# Patient Record
Sex: MALE | Race: WHITE | NOT HISPANIC OR LATINO
[De-identification: names, ages, dates, MRNs, and addresses within clinical notes are randomized per-mention and may not be internally consistent; named-entity substitution may affect disease eponyms.]

---

## 2017-07-11 ENCOUNTER — APPOINTMENT (OUTPATIENT)
Dept: VASCULAR SURGERY | Facility: CLINIC | Age: 77
End: 2017-07-11

## 2017-07-11 VITALS
DIASTOLIC BLOOD PRESSURE: 77 MMHG | HEART RATE: 64 BPM | HEIGHT: 65 IN | SYSTOLIC BLOOD PRESSURE: 134 MMHG | RESPIRATION RATE: 16 BRPM | TEMPERATURE: 98.6 F | BODY MASS INDEX: 36.49 KG/M2 | WEIGHT: 219 LBS

## 2018-07-10 ENCOUNTER — APPOINTMENT (OUTPATIENT)
Dept: VASCULAR SURGERY | Facility: CLINIC | Age: 78
End: 2018-07-10
Payer: MEDICARE

## 2018-07-10 VITALS
SYSTOLIC BLOOD PRESSURE: 136 MMHG | WEIGHT: 215 LBS | HEIGHT: 65 IN | HEART RATE: 65 BPM | BODY MASS INDEX: 35.82 KG/M2 | DIASTOLIC BLOOD PRESSURE: 71 MMHG

## 2018-07-10 PROCEDURE — 93925 LOWER EXTREMITY STUDY: CPT

## 2018-07-10 PROCEDURE — 99213 OFFICE O/P EST LOW 20 MIN: CPT

## 2018-10-24 ENCOUNTER — APPOINTMENT (OUTPATIENT)
Dept: GASTROENTEROLOGY | Facility: CLINIC | Age: 78
End: 2018-10-24
Payer: MEDICARE

## 2018-10-24 VITALS
OXYGEN SATURATION: 99 % | SYSTOLIC BLOOD PRESSURE: 118 MMHG | WEIGHT: 221 LBS | HEART RATE: 72 BPM | RESPIRATION RATE: 14 BRPM | BODY MASS INDEX: 35.52 KG/M2 | DIASTOLIC BLOOD PRESSURE: 70 MMHG | HEIGHT: 66 IN

## 2018-10-24 DIAGNOSIS — K57.32 DIVERTICULITIS OF LARGE INTESTINE W/OUT PERFORATION OR ABSCESS W/OUT BLEEDING: ICD-10-CM

## 2018-10-24 PROCEDURE — 99204 OFFICE O/P NEW MOD 45 MIN: CPT

## 2018-10-25 PROBLEM — K57.32 DIVERTICULITIS OF SIGMOID COLON: Status: ACTIVE | Noted: 2018-10-25

## 2019-04-18 ENCOUNTER — APPOINTMENT (OUTPATIENT)
Dept: OTOLARYNGOLOGY | Facility: CLINIC | Age: 79
End: 2019-04-18
Payer: MEDICARE

## 2019-04-18 DIAGNOSIS — H91.8X2 OTHER SPECIFIED HEARING LOSS, LEFT EAR: ICD-10-CM

## 2019-04-18 PROCEDURE — 92557 COMPREHENSIVE HEARING TEST: CPT

## 2019-04-18 PROCEDURE — 99203 OFFICE O/P NEW LOW 30 MIN: CPT | Mod: 25

## 2019-04-18 PROCEDURE — 92567 TYMPANOMETRY: CPT

## 2019-04-18 NOTE — ASSESSMENT
[FreeTextEntry1] : Patient with bilateral snhl with marked difference in discrim score in left ear over past few mos.  Exam unremarkable.  Recommend MRI - further treatment pending result.  Follow up one month or as necessary.

## 2019-04-18 NOTE — HISTORY OF PRESENT ILLNESS
[de-identified] : Hx of hearing aides since 2016 - Approx 4 mos ago noted increased difficulty with left ear.  Cerumen cleaned and did have audio which showed marked decrease in hearing. Recently noted to have ? abn TM by audiologist.  No problem with ears.  Occ sl balance issues.  No imaging done  4 mos ago.

## 2019-04-18 NOTE — REVIEW OF SYSTEMS
[Sneezing] : sneezing [Seasonal Allergies] : seasonal allergies [Hearing Loss] : hearing loss [Post Nasal Drip] : post nasal drip [Cough] : cough [Negative] : Heme/Lymph

## 2019-05-21 ENCOUNTER — APPOINTMENT (OUTPATIENT)
Dept: OTOLARYNGOLOGY | Facility: CLINIC | Age: 79
End: 2019-05-21

## 2019-07-02 ENCOUNTER — APPOINTMENT (OUTPATIENT)
Dept: ORTHOPEDIC SURGERY | Facility: CLINIC | Age: 79
End: 2019-07-02
Payer: MEDICARE

## 2019-07-02 VITALS
BODY MASS INDEX: 35.52 KG/M2 | SYSTOLIC BLOOD PRESSURE: 119 MMHG | HEIGHT: 66 IN | WEIGHT: 221 LBS | HEART RATE: 77 BPM | DIASTOLIC BLOOD PRESSURE: 71 MMHG

## 2019-07-02 DIAGNOSIS — M25.422 EFFUSION, LEFT ELBOW: ICD-10-CM

## 2019-07-02 PROCEDURE — 99203 OFFICE O/P NEW LOW 30 MIN: CPT

## 2019-07-02 NOTE — PHYSICAL EXAM
[de-identified] : Constitutional: Well-nourished, well-developed, No acute distress\par Respiratory:  Good respiratory effort, no SOB\par Lymphatic: No regional lymphadenopathy, no lymphedema\par Psychiatric: Pleasant and normal affect, alert and oriented x3\par Skin: Clean dry and intact B/L UE/LE\par Musculoskeletal: normal except where as noted in regional exam\par \par \par Right Elbow:\par APPEARANCE: no marked deformities, no swelling or malalignment\par POSITIVE TENDERNESS: none\par NONTENDER: lateral and medial epicondyles, posterior capsules, and other areas of the elbow. \par ROM: full & painless flext/ext, pronation/supination. \par RESISTIVE TESTING: painless resisted elbow flexion/extension, wrist/long finger extension. painless resisted wrist/long finger flexion. painless resisted supination/pronation. \par SPECIAL TESTS: stable v/v stress. neg tinel's cubital tunnel\par Vasc: 2+ radial pulse\par Neuro: AIN, PIN, Ulnar nerve intact to motor\par Sensation: Intact to light touch throughout\par B/L Shoulders:  No asymmetry, malalignment, or swelling, Full ROM, 5/5 strength in flexion/ext, Abd/Add, IR/ER, Joints stable\par B/L Wrist and Hand:  No asymmetry, malalignment, or swelling, Full ROM, 5/5 strength in wrist and long finger flexion/ext, and radial/ulnar deviation, Joints stable\par \par Left Elbow:\par APPEARANCE: + small swelling posteriorly over the olecranon , no erythema, no fluctuance, no marked deformities or malalignment\par POSITIVE TENDERNESS: + mild tenderness with palpation of the olecranon bursa, no bony tenderness of the olecranon\par NONTENDER: lateral and medial epicondyles, posterior capsules, and other areas of the elbow. \par ROM: full & painless flext/ext, pronation/supination. \par RESISTIVE TESTING: painless resisted elbow flexion/extension, wrist/long finger flexion and extension, supination/pronation. \par SPECIAL TESTS: stable v/v stress. neg tinel's cubital tunnel\par Vasc: 2+ radial pulse\par Neuro: AIN, PIN, Ulnar nerve intact to motor\par Sensation: Intact to light touch throughout\par \par  [de-identified] : I reviewed and clinically correlated the following outside imaging studies,\par \par LEFT ELBOW XRAY\par HISTORY: M25.522 Left elbow pain\par Views:AP, lateral and oblique.\par There is an enthesophyte extending from the posterior aspect of the olecranon. A lucency\par extends through a portion of the enthesophyte, possibly indicating a fracture.\par The distal humerus, proximal radius and proximal ulna reveal intact cortical margins.\par The trabecular architecture is normal.\par There are no significant degenerative changes.\par There are no calcified joint bodies.\par There are no periarticular calcifications.\par There is no evidence of a joint nor bursal effusion.\par There are no radiopaque foreign bodies\par There is moderate olecranon bursal region soft tissue swelling.\par IMPRESSION: There is an enthesophyte extending from the posterior aspect of the\par olecranon. A lucency extends through a portion of the enthesophyte, possibly indicating a\par fracture of the enthesophyte.\par There is moderate olecranon bursal region soft tissue swelling.

## 2019-07-02 NOTE — DISCUSSION/SUMMARY
[de-identified] : Discussed findings of today's exam and possible causes of patient's pain.  Educated patient on their most probable diagnosis of small left olecranon effusion.  Reviewed possible courses of treatment, and we collaboratively decided best course of treatment at this time will include watchful waiting. I advised the patient that he only has approximately 2-3 cc of fluid within the bursa, would not recommend aspiration attempt at this time. Review of his prior x-ray shows that he has large osteophyte formation off the olecranon process, this is the etiology of his current bursa effusion. Recommend watchful waiting if it resolves on its own no further intervention is needed. If more fluid fills within the bursa may consider aspiration at that time.  Follow up as needed.  Patient appreciates and agrees with current plan.\par \par This note was generated using dragon medical dictation software.  A reasonable effort has been made for proofreading its contents, but typos may still remain.  If there are any questions or points of clarification needed please notify my office.

## 2019-07-02 NOTE — HISTORY OF PRESENT ILLNESS
[de-identified] : Patient is here for left elbow pain that began on 6/29/19 without inciting event. He said that 2 weeks prior he was on the swings with his granddaughter which was rubbing against his elbow but he did not notice any pain unless he touched his elbow. He was able to move it and continued to go to the gym without issue. On Saturday he had pain and was unable to sleep on that side. He took Aleve which has helped. He had an xray done yesterday. Denies N/T/R/Prior injury.

## 2019-07-09 ENCOUNTER — APPOINTMENT (OUTPATIENT)
Dept: VASCULAR SURGERY | Facility: CLINIC | Age: 79
End: 2019-07-09

## 2019-07-11 ENCOUNTER — APPOINTMENT (OUTPATIENT)
Dept: VASCULAR SURGERY | Facility: CLINIC | Age: 79
End: 2019-07-11
Payer: MEDICARE

## 2019-07-11 PROCEDURE — 99213 OFFICE O/P EST LOW 20 MIN: CPT

## 2019-07-11 PROCEDURE — 93925 LOWER EXTREMITY STUDY: CPT

## 2019-07-11 NOTE — HISTORY OF PRESENT ILLNESS
[FreeTextEntry1] : 79 yo male with pmhx of hld, cad, hyperparathyroidism s/p parathyroidectomy, and kidney stones presents for follow up bilateral lower extremity pop artery aneurysms.  pt without any complaints at this time doing well \par pt denies any history of claudication, lower extremity wounds or ulcers

## 2019-07-11 NOTE — ASSESSMENT
[FreeTextEntry1] : 79 yo male with pmhx of hld, former smoker, hyperparathyroidism with nephrolithiasis s/p parathyroidectomy presents for follow up of b/l lower extremity pop artery aneurysm \par arterial duplex shows stable right pop at 1.8 cm, left pop at 1.4 cm and right sfa of 1.5cm.  \par no change in size will continue to monitor \par pt to follow up in 1 year with repeat duplex

## 2019-07-11 NOTE — PHYSICAL EXAM
[JVD] : no jugular venous distention  [Normal Rate and Rhythm] : normal rate and rhythm [2+] : left 2+ [Varicose Veins Of Lower Extremities] : not present [] : not present [Ankle Swelling (On Exam)] : not present [No Rash or Lesion] : No rash or lesion [Skin Ulcer] : no ulcer [Calm] : calm [Alert] : alert [de-identified] : motor intact b/l lower extremities [de-identified] : appears well [de-identified] : sensation intact b/l lower extremities

## 2019-07-21 ENCOUNTER — TRANSCRIPTION ENCOUNTER (OUTPATIENT)
Age: 79
End: 2019-07-21

## 2020-02-12 ENCOUNTER — APPOINTMENT (OUTPATIENT)
Dept: GASTROENTEROLOGY | Facility: CLINIC | Age: 80
End: 2020-02-12

## 2020-07-14 ENCOUNTER — APPOINTMENT (OUTPATIENT)
Dept: VASCULAR SURGERY | Facility: CLINIC | Age: 80
End: 2020-07-14
Payer: MEDICARE

## 2020-07-14 VITALS — SYSTOLIC BLOOD PRESSURE: 129 MMHG | DIASTOLIC BLOOD PRESSURE: 65 MMHG | HEART RATE: 63 BPM

## 2020-07-14 VITALS — TEMPERATURE: 97.3 F

## 2020-07-14 PROCEDURE — 99213 OFFICE O/P EST LOW 20 MIN: CPT

## 2020-07-14 PROCEDURE — 93925 LOWER EXTREMITY STUDY: CPT

## 2020-07-16 NOTE — ASSESSMENT
[FreeTextEntry1] : 80 yo male with pmhx of hld, former smoker, hyperparathyroidism with nephrolithiasis s/p parathyroidectomy presents for evaluation of pop artery aneurysm on arterial duplex \par \par -us arterial duplex of lower extremity aneurysm shows stable right lower extremity aneurysm with increase in size of left lower extremity aneurysm to 2 cm at the distal sfa with minimal thrombus \par -will repeat duplex of left lower extremity in 6 months

## 2020-07-16 NOTE — PHYSICAL EXAM
[2+] : left 2+ [No Rash or Lesion] : No rash or lesion [Alert] : alert [Calm] : calm [JVD] : no jugular venous distention  [Ankle Swelling (On Exam)] : not present [Varicose Veins Of Lower Extremities] : not present [Skin Ulcer] : no ulcer [de-identified] : appears well [] : not present [de-identified] : sensation intact b/l lower extremities [de-identified] : motor intact b/l lower extremities

## 2020-07-16 NOTE — HISTORY OF PRESENT ILLNESS
[FreeTextEntry1] : 78 yo male with pmhx of hld, cad, hyperparathyroidism s/p parathyroidectomy, and kidney stones presents for follow up bilateral lower extremity pop artery aneurysms.  pt without any complaints at this time doing well \par pt denies any history of claudication, lower extremity wounds or ulcers

## 2021-01-19 ENCOUNTER — NON-APPOINTMENT (OUTPATIENT)
Age: 81
End: 2021-01-19

## 2021-01-19 ENCOUNTER — APPOINTMENT (OUTPATIENT)
Dept: VASCULAR SURGERY | Facility: CLINIC | Age: 81
End: 2021-01-19
Payer: MEDICARE

## 2021-01-19 VITALS
BODY MASS INDEX: 34.87 KG/M2 | DIASTOLIC BLOOD PRESSURE: 68 MMHG | HEART RATE: 66 BPM | WEIGHT: 217 LBS | SYSTOLIC BLOOD PRESSURE: 120 MMHG | HEIGHT: 66 IN

## 2021-01-19 VITALS — TEMPERATURE: 97.5 F

## 2021-01-19 PROCEDURE — 99213 OFFICE O/P EST LOW 20 MIN: CPT

## 2021-01-19 PROCEDURE — 93925 LOWER EXTREMITY STUDY: CPT

## 2021-01-19 PROCEDURE — 99072 ADDL SUPL MATRL&STAF TM PHE: CPT

## 2021-01-19 NOTE — ASSESSMENT
[FreeTextEntry1] : 78 yo male with pmhx of hld, former smoker, hyperparathyroidism with nephrolithiasis s/p parathyroidectomy presents for evaluation of pop artery aneurysm on arterial duplex \par \par -us arterial duplex of lower extremity aneurysm shows stable right lower extremity aneurysm with increase in size of left lower extremity aneurysm to 2 cm at the distal sfa with  thrombus \par -will repeat duplex of left lower extremity in 6 months

## 2021-01-19 NOTE — PHYSICAL EXAM
[JVD] : no jugular venous distention  [2+] : left 2+ [Ankle Swelling (On Exam)] : not present [Varicose Veins Of Lower Extremities] : not present [] : not present [No Rash or Lesion] : No rash or lesion [Skin Ulcer] : no ulcer [Alert] : alert [Calm] : calm [de-identified] : appears well [de-identified] : motor intact b/l lower extremities [de-identified] : sensation intact b/l lower extremities

## 2021-03-31 ENCOUNTER — TRANSCRIPTION ENCOUNTER (OUTPATIENT)
Age: 81
End: 2021-03-31

## 2021-07-20 ENCOUNTER — APPOINTMENT (OUTPATIENT)
Dept: VASCULAR SURGERY | Facility: CLINIC | Age: 81
End: 2021-07-20
Payer: MEDICARE

## 2021-07-20 PROCEDURE — 99213 OFFICE O/P EST LOW 20 MIN: CPT

## 2021-07-20 PROCEDURE — 99072 ADDL SUPL MATRL&STAF TM PHE: CPT

## 2021-07-20 PROCEDURE — 93925 LOWER EXTREMITY STUDY: CPT

## 2021-07-20 NOTE — ASSESSMENT
[FreeTextEntry1] : 79 yo male with pmhx of hld, former smoker, hyperparathyroidism with nephrolithiasis s/p parathyroidectomy presents for evaluation of pop artery aneurysm on arterial duplex \par \par -us arterial duplex of lower extremity aneurysm shows stable right lower extremity aneurysm with increase in size of left lower extremity aneurysm to 2.1 cm at the distal sfa with  thrombus \par -will repeat duplex of left lower extremity in 6 months

## 2021-07-20 NOTE — PHYSICAL EXAM
[JVD] : no jugular venous distention  [2+] : left 2+ [Ankle Swelling (On Exam)] : not present [Varicose Veins Of Lower Extremities] : not present [] : not present [No Rash or Lesion] : No rash or lesion [Skin Ulcer] : no ulcer [Alert] : alert [Calm] : calm [de-identified] : appears well [de-identified] : motor intact b/l lower extremities [de-identified] : sensation intact b/l lower extremities

## 2022-07-12 ENCOUNTER — APPOINTMENT (OUTPATIENT)
Dept: VASCULAR SURGERY | Facility: CLINIC | Age: 82
End: 2022-07-12

## 2022-07-12 VITALS
TEMPERATURE: 97.8 F | SYSTOLIC BLOOD PRESSURE: 128 MMHG | DIASTOLIC BLOOD PRESSURE: 71 MMHG | WEIGHT: 214 LBS | HEIGHT: 66 IN | BODY MASS INDEX: 34.39 KG/M2 | HEART RATE: 62 BPM

## 2022-07-12 PROCEDURE — 99213 OFFICE O/P EST LOW 20 MIN: CPT

## 2022-07-12 PROCEDURE — 93925 LOWER EXTREMITY STUDY: CPT

## 2022-07-12 NOTE — ASSESSMENT
[FreeTextEntry1] : 82 yo male with pmhx of hld, former smoker, hyperparathyroidism with nephrolithiasis s/p parathyroidectomy presents for evaluation of pop artery aneurysm on arterial duplex \par \par -us arterial duplex of lower extremity aneurysm shows stable right lower extremity aneurysm with increase in size of left lower extremity aneurysm to 2.3 cm at the distal sfa with  thrombus \par -will repeat duplex of left lower extremity in 6 months along with aortic duplex

## 2022-07-12 NOTE — PHYSICAL EXAM
[JVD] : no jugular venous distention  [Normal Breath Sounds] : Normal breath sounds [Normal Rate and Rhythm] : normal rate and rhythm [2+] : left 2+ [Ankle Swelling (On Exam)] : not present [] : not present [Varicose Veins Of Lower Extremities] : not present [No Rash or Lesion] : No rash or lesion [Skin Ulcer] : no ulcer [Alert] : alert [Calm] : calm [de-identified] : appears well [de-identified] : motor intact b/l lower extremities [de-identified] : sensation intact b/l lower extremities

## 2022-07-12 NOTE — HISTORY OF PRESENT ILLNESS
[FreeTextEntry1] : 82 yo male with pmhx of hld, cad, hyperparathyroidism s/p parathyroidectomy, and kidney stones presents for follow up bilateral lower extremity pop artery aneurysms.  pt without any complaints at this time doing well \par pt denies any history of claudication, lower extremity wounds or ulcers

## 2022-11-22 ENCOUNTER — NON-APPOINTMENT (OUTPATIENT)
Age: 82
End: 2022-11-22

## 2022-11-22 ENCOUNTER — APPOINTMENT (OUTPATIENT)
Dept: OTOLARYNGOLOGY | Facility: CLINIC | Age: 82
End: 2022-11-22

## 2022-11-22 VITALS
SYSTOLIC BLOOD PRESSURE: 124 MMHG | HEART RATE: 66 BPM | BODY MASS INDEX: 35.49 KG/M2 | DIASTOLIC BLOOD PRESSURE: 78 MMHG | HEIGHT: 65 IN | WEIGHT: 213 LBS

## 2022-11-22 DIAGNOSIS — H90.3 SENSORINEURAL HEARING LOSS, BILATERAL: ICD-10-CM

## 2022-11-22 PROCEDURE — 92567 TYMPANOMETRY: CPT

## 2022-11-22 PROCEDURE — 99203 OFFICE O/P NEW LOW 30 MIN: CPT

## 2022-11-22 PROCEDURE — 92557 COMPREHENSIVE HEARING TEST: CPT

## 2022-11-22 NOTE — DATA REVIEWED
[de-identified] : AD: Type C Essentially moderate SNHL, 250-6000 hz, severe HF HL 8K Hz\par AS; Type A Essentially mild to severe SNHL 250-8000 Hz.  Improvement in SRS since 2019.  No change in pure tones since 2019\par REC; ENT f/u, re-eval per MD, consider new hearing aids (lost right)\par

## 2022-11-22 NOTE — ASSESSMENT
[FreeTextEntry1] :   AD Type mod SNHL, AS mild to severe SNHL w type A.  no significant change since 2019\par \par \par stressed importance of wearing HA\par annual audio

## 2022-11-22 NOTE — HISTORY OF PRESENT ILLNESS
[de-identified] : 82 yr old male w sudden hearing loss AS about 3-4 yrs ago, MRI negative at that time.  Aided AU from outside HA dealer, but rarely wears them\par -tinnitus, dizzy\par -hx otitis, noise exp, head trauma\par +FH mother unilat

## 2023-01-17 ENCOUNTER — APPOINTMENT (OUTPATIENT)
Dept: VASCULAR SURGERY | Facility: CLINIC | Age: 83
End: 2023-01-17

## 2023-03-10 ENCOUNTER — NON-APPOINTMENT (OUTPATIENT)
Age: 83
End: 2023-03-10

## 2023-03-10 ENCOUNTER — APPOINTMENT (OUTPATIENT)
Dept: RHEUMATOLOGY | Facility: CLINIC | Age: 83
End: 2023-03-10
Payer: MEDICARE

## 2023-03-10 VITALS
HEIGHT: 65 IN | DIASTOLIC BLOOD PRESSURE: 60 MMHG | WEIGHT: 202 LBS | OXYGEN SATURATION: 97 % | HEART RATE: 70 BPM | TEMPERATURE: 98.1 F | BODY MASS INDEX: 33.66 KG/M2 | SYSTOLIC BLOOD PRESSURE: 120 MMHG

## 2023-03-10 PROCEDURE — 36415 COLL VENOUS BLD VENIPUNCTURE: CPT

## 2023-03-10 PROCEDURE — 99204 OFFICE O/P NEW MOD 45 MIN: CPT | Mod: 25

## 2023-03-13 LAB
ALBUMIN SERPL ELPH-MCNC: 4 G/DL
ALP BLD-CCNC: 72 U/L
ALT SERPL-CCNC: 14 U/L
ANION GAP SERPL CALC-SCNC: 13 MMOL/L
AST SERPL-CCNC: 17 U/L
BASOPHILS # BLD AUTO: 0.07 K/UL
BASOPHILS NFR BLD AUTO: 0.8 %
BILIRUB SERPL-MCNC: 1.1 MG/DL
BUN SERPL-MCNC: 18 MG/DL
CALCIUM SERPL-MCNC: 9.4 MG/DL
CHLORIDE SERPL-SCNC: 102 MMOL/L
CO2 SERPL-SCNC: 24 MMOL/L
CREAT SERPL-MCNC: 1.06 MG/DL
CRP SERPL-MCNC: 7 MG/L
EGFR: 71 ML/MIN/1.73M2
EOSINOPHIL # BLD AUTO: 0.11 K/UL
EOSINOPHIL NFR BLD AUTO: 1.3 %
ERYTHROCYTE [SEDIMENTATION RATE] IN BLOOD BY WESTERGREN METHOD: 34 MM/HR
GLUCOSE SERPL-MCNC: 109 MG/DL
HCT VFR BLD CALC: 45.8 %
HGB BLD-MCNC: 14.6 G/DL
IMM GRANULOCYTES NFR BLD AUTO: 0.8 %
LYMPHOCYTES # BLD AUTO: 1.87 K/UL
LYMPHOCYTES NFR BLD AUTO: 21.3 %
MAN DIFF?: NORMAL
MCHC RBC-ENTMCNC: 29.6 PG
MCHC RBC-ENTMCNC: 31.9 GM/DL
MCV RBC AUTO: 92.7 FL
MONOCYTES # BLD AUTO: 0.92 K/UL
MONOCYTES NFR BLD AUTO: 10.5 %
NEUTROPHILS # BLD AUTO: 5.72 K/UL
NEUTROPHILS NFR BLD AUTO: 65.3 %
PLATELET # BLD AUTO: 162 K/UL
POTASSIUM SERPL-SCNC: 3.9 MMOL/L
PROT SERPL-MCNC: 6.7 G/DL
RBC # BLD: 4.94 M/UL
RBC # FLD: 16.5 %
SODIUM SERPL-SCNC: 140 MMOL/L
WBC # FLD AUTO: 8.76 K/UL

## 2023-03-15 LAB
ALBUMIN MFR SERPL ELPH: 55.3 %
ALBUMIN SERPL-MCNC: 3.6 G/DL
ALBUMIN/GLOB SERPL: 1.2 RATIO
ALPHA1 GLOB MFR SERPL ELPH: 4.5 %
ALPHA1 GLOB SERPL ELPH-MCNC: 0.3 G/DL
ALPHA2 GLOB MFR SERPL ELPH: 13.2 %
ALPHA2 GLOB SERPL ELPH-MCNC: 0.9 G/DL
B-GLOBULIN MFR SERPL ELPH: 12.2 %
B-GLOBULIN SERPL ELPH-MCNC: 0.8 G/DL
GAMMA GLOB FLD ELPH-MCNC: 1 G/DL
GAMMA GLOB MFR SERPL ELPH: 14.8 %
INTERPRETATION SERPL IEP-IMP: NORMAL
M PROTEIN SPEC IFE-MCNC: NORMAL
PROT SERPL-MCNC: 6.6 G/DL
PROT SERPL-MCNC: 6.6 G/DL

## 2023-03-24 ENCOUNTER — APPOINTMENT (OUTPATIENT)
Dept: SURGERY | Facility: CLINIC | Age: 83
End: 2023-03-24
Payer: MEDICARE

## 2023-03-24 VITALS
OXYGEN SATURATION: 97 % | HEART RATE: 92 BPM | TEMPERATURE: 97.6 F | BODY MASS INDEX: 33.66 KG/M2 | SYSTOLIC BLOOD PRESSURE: 104 MMHG | WEIGHT: 202 LBS | RESPIRATION RATE: 16 BRPM | HEIGHT: 65 IN | DIASTOLIC BLOOD PRESSURE: 63 MMHG

## 2023-03-24 PROCEDURE — 99204 OFFICE O/P NEW MOD 45 MIN: CPT

## 2023-03-24 NOTE — CONSULT LETTER
[facility] [Dear  ___] : Dear  [unfilled], [Consult Letter:] : I had the pleasure of evaluating your patient, [unfilled]. [Please see my note below.] : Please see my note below. [Consult Closing:] : Thank you very much for allowing me to participate in the care of this patient.  If you have any questions, please do not hesitate to contact me. [Sincerely,] : Sincerely, [FreeTextEntry3] : Wm Anselmo MARQUEZ

## 2023-03-24 NOTE — PHYSICAL EXAM
[JVD] : no jugular venous distention  [Normal Breath Sounds] : Normal breath sounds [Normal Heart Sounds] : normal heart sounds [Abdomen Tenderness] : ~T ~M No abdominal tenderness [No HSM] : no hepatosplenomegaly [No Rash or Lesion] : No rash or lesion [Alert] : alert [Oriented to Person] : oriented to person [Oriented to Place] : oriented to place [Oriented to Time] : oriented to time [Calm] : calm [de-identified] : NAD [de-identified] : NC/AT PER [de-identified] : soft, NL BS. No AAA [de-identified] : no CVA tenderness [de-identified] : moves all 4 extr 5/5

## 2023-03-24 NOTE — PLAN
[FreeTextEntry1] : Aortic and popliteal duplex. ? referral for intervention based on results. Pt/dtr accept.\par total time > 47 minutes

## 2023-03-24 NOTE — REASON FOR VISIT
[Consultation] : a consultation visit [Family Member] : family member [FreeTextEntry1] : Popliteal anuerysm

## 2023-04-19 NOTE — HISTORY OF PRESENT ILLNESS
[FreeTextEntry1] : Here today initial consultation for evaluation of PMR\par \par Around Halloween patient developed bilateral shoulder pain.  Was seen by PMD and diagnosed with bursitis, no treatment at that time.\par Symptoms progressively worsened and by Thanksgiving he had pain in both shoulders and hands, associated with swelling and stiffness.  He had difficulty using his hands at that time.  Seen by PMD/nurse practitioner again and was treated with meloxicam, gabapentin and Medrol Dosepak which helped significantly.  Labs done at that time negative per patient\par His symptoms were progressively getting worse and were starting to affect his activities of daily living.\par Was seen by orthopedics in January and diagnosed with PMR, was given a for steroid course of prednisone 10 mg a day for 10 days followed by 5 mg a day for 10 days during which his symptoms completely resolved.  However symptoms returned once off and was restarted on a second prednisone course, currently on 5 mg a day

## 2023-04-19 NOTE — ASSESSMENT
[FreeTextEntry1] : 82-year-old male here today in initial consultation\par \par Polymyalgia rheumatica:\par – Literature on PMR given to review\par – Symptoms of GCA reviewed in detail, advised to go to the nearest ER for any GCA symptoms\par – Check labs\par – Start prednisone taper, started 20 mg a day for 2 weeks, then 17.5 mg a day for 2 weeks, then 15 mg a day for 2 weeks/until next visit.  Side effects of prednisone reviewed and patient advised not to take any NSAIDs while taking prednisone\par – Obtain prior labs from PMD and orthopedics\par \par All questions and concerns addressed to the best possible ability, patient and his daughter verbalized understanding and are agreeable\par \par Follow-up in 6 weeks or sooner as needed\par \par

## 2023-04-28 ENCOUNTER — APPOINTMENT (OUTPATIENT)
Dept: RHEUMATOLOGY | Facility: CLINIC | Age: 83
End: 2023-04-28
Payer: MEDICARE

## 2023-04-28 VITALS
OXYGEN SATURATION: 96 % | DIASTOLIC BLOOD PRESSURE: 70 MMHG | TEMPERATURE: 97.4 F | WEIGHT: 207 LBS | HEART RATE: 72 BPM | HEIGHT: 65 IN | BODY MASS INDEX: 34.49 KG/M2 | SYSTOLIC BLOOD PRESSURE: 120 MMHG

## 2023-04-28 PROCEDURE — 99214 OFFICE O/P EST MOD 30 MIN: CPT

## 2023-05-01 LAB
ALBUMIN SERPL ELPH-MCNC: 4 G/DL
ALP BLD-CCNC: 64 U/L
ALT SERPL-CCNC: 20 U/L
ANION GAP SERPL CALC-SCNC: 18 MMOL/L
AST SERPL-CCNC: 26 U/L
BASOPHILS # BLD AUTO: 0.03 K/UL
BASOPHILS NFR BLD AUTO: 0.4 %
BILIRUB SERPL-MCNC: 1 MG/DL
BUN SERPL-MCNC: 19 MG/DL
CALCIUM SERPL-MCNC: 9.5 MG/DL
CCP AB SER IA-ACNC: <8 UNITS
CHLORIDE SERPL-SCNC: 102 MMOL/L
CO2 SERPL-SCNC: 22 MMOL/L
CREAT SERPL-MCNC: 1.13 MG/DL
CRP SERPL-MCNC: <3 MG/L
EGFR: 66 ML/MIN/1.73M2
EOSINOPHIL # BLD AUTO: 0.02 K/UL
EOSINOPHIL NFR BLD AUTO: 0.2 %
ERYTHROCYTE [SEDIMENTATION RATE] IN BLOOD BY WESTERGREN METHOD: 19 MM/HR
GLUCOSE SERPL-MCNC: 160 MG/DL
HCT VFR BLD CALC: 46.6 %
HGB BLD-MCNC: 15.1 G/DL
IMM GRANULOCYTES NFR BLD AUTO: 1.6 %
LYMPHOCYTES # BLD AUTO: 1.03 K/UL
LYMPHOCYTES NFR BLD AUTO: 12.4 %
MAN DIFF?: NORMAL
MCHC RBC-ENTMCNC: 29.8 PG
MCHC RBC-ENTMCNC: 32.4 GM/DL
MCV RBC AUTO: 92.1 FL
MONOCYTES # BLD AUTO: 0.33 K/UL
MONOCYTES NFR BLD AUTO: 4 %
NEUTROPHILS # BLD AUTO: 6.76 K/UL
NEUTROPHILS NFR BLD AUTO: 81.4 %
PLATELET # BLD AUTO: 118 K/UL
POTASSIUM SERPL-SCNC: 4.6 MMOL/L
PROT SERPL-MCNC: 6.3 G/DL
RBC # BLD: 5.06 M/UL
RBC # FLD: 17.1 %
RF+CCP IGG SER-IMP: NEGATIVE
RHEUMATOID FACT SER QL: <10 IU/ML
SODIUM SERPL-SCNC: 142 MMOL/L
WBC # FLD AUTO: 8.3 K/UL

## 2023-05-19 ENCOUNTER — APPOINTMENT (OUTPATIENT)
Dept: RHEUMATOLOGY | Facility: CLINIC | Age: 83
End: 2023-05-19

## 2023-06-16 ENCOUNTER — APPOINTMENT (OUTPATIENT)
Dept: RHEUMATOLOGY | Facility: CLINIC | Age: 83
End: 2023-06-16
Payer: MEDICARE

## 2023-06-16 VITALS
SYSTOLIC BLOOD PRESSURE: 100 MMHG | OXYGEN SATURATION: 92 % | DIASTOLIC BLOOD PRESSURE: 60 MMHG | WEIGHT: 215 LBS | HEIGHT: 65 IN | BODY MASS INDEX: 35.82 KG/M2 | TEMPERATURE: 97.6 F | HEART RATE: 66 BPM

## 2023-06-16 PROCEDURE — 99214 OFFICE O/P EST MOD 30 MIN: CPT

## 2023-07-21 ENCOUNTER — APPOINTMENT (OUTPATIENT)
Dept: PHARMACY | Facility: CLINIC | Age: 83
End: 2023-07-21

## 2023-09-15 ENCOUNTER — APPOINTMENT (OUTPATIENT)
Dept: RHEUMATOLOGY | Facility: CLINIC | Age: 83
End: 2023-09-15
Payer: MEDICARE

## 2023-09-15 ENCOUNTER — APPOINTMENT (OUTPATIENT)
Dept: PULMONOLOGY | Facility: CLINIC | Age: 83
End: 2023-09-15

## 2023-09-15 VITALS
WEIGHT: 226 LBS | HEIGHT: 65 IN | DIASTOLIC BLOOD PRESSURE: 62 MMHG | OXYGEN SATURATION: 92 % | HEART RATE: 70 BPM | SYSTOLIC BLOOD PRESSURE: 108 MMHG | BODY MASS INDEX: 37.65 KG/M2 | TEMPERATURE: 96.5 F

## 2023-09-15 PROCEDURE — 99214 OFFICE O/P EST MOD 30 MIN: CPT

## 2023-10-16 ENCOUNTER — APPOINTMENT (OUTPATIENT)
Dept: PULMONOLOGY | Facility: CLINIC | Age: 83
End: 2023-10-16
Payer: MEDICARE

## 2023-10-16 VITALS
WEIGHT: 222 LBS | SYSTOLIC BLOOD PRESSURE: 109 MMHG | HEART RATE: 83 BPM | RESPIRATION RATE: 16 BRPM | OXYGEN SATURATION: 94 % | BODY MASS INDEX: 36.99 KG/M2 | DIASTOLIC BLOOD PRESSURE: 60 MMHG | HEIGHT: 65 IN

## 2023-10-16 DIAGNOSIS — M35.3 POLYMYALGIA RHEUMATICA: ICD-10-CM

## 2023-10-16 DIAGNOSIS — I25.10 ATHEROSCLEROTIC HEART DISEASE OF NATIVE CORONARY ARTERY W/OUT ANGINA PECTORIS: ICD-10-CM

## 2023-10-16 PROCEDURE — 99204 OFFICE O/P NEW MOD 45 MIN: CPT

## 2023-12-04 ENCOUNTER — NON-APPOINTMENT (OUTPATIENT)
Age: 83
End: 2023-12-04

## 2023-12-06 ENCOUNTER — TRANSCRIPTION ENCOUNTER (OUTPATIENT)
Age: 83
End: 2023-12-06

## 2023-12-06 ENCOUNTER — OUTPATIENT (OUTPATIENT)
Dept: OUTPATIENT SERVICES | Facility: HOSPITAL | Age: 83
LOS: 1 days | End: 2023-12-06
Payer: MEDICARE

## 2023-12-06 VITALS
SYSTOLIC BLOOD PRESSURE: 116 MMHG | HEART RATE: 54 BPM | DIASTOLIC BLOOD PRESSURE: 56 MMHG | RESPIRATION RATE: 16 BRPM | HEIGHT: 65 IN | OXYGEN SATURATION: 97 % | TEMPERATURE: 97 F | WEIGHT: 216.05 LBS

## 2023-12-06 VITALS
DIASTOLIC BLOOD PRESSURE: 57 MMHG | SYSTOLIC BLOOD PRESSURE: 118 MMHG | OXYGEN SATURATION: 95 % | RESPIRATION RATE: 15 BRPM | HEART RATE: 56 BPM

## 2023-12-06 DIAGNOSIS — Z90.49 ACQUIRED ABSENCE OF OTHER SPECIFIED PARTS OF DIGESTIVE TRACT: Chronic | ICD-10-CM

## 2023-12-06 DIAGNOSIS — Z98.890 OTHER SPECIFIED POSTPROCEDURAL STATES: Chronic | ICD-10-CM

## 2023-12-06 DIAGNOSIS — I50.22 CHRONIC SYSTOLIC (CONGESTIVE) HEART FAILURE: ICD-10-CM

## 2023-12-06 LAB
ANION GAP SERPL CALC-SCNC: 11 MMOL/L — SIGNIFICANT CHANGE UP (ref 5–17)
ANION GAP SERPL CALC-SCNC: 11 MMOL/L — SIGNIFICANT CHANGE UP (ref 5–17)
BUN SERPL-MCNC: 11 MG/DL — SIGNIFICANT CHANGE UP (ref 7–23)
BUN SERPL-MCNC: 11 MG/DL — SIGNIFICANT CHANGE UP (ref 7–23)
CALCIUM SERPL-MCNC: 8.8 MG/DL — SIGNIFICANT CHANGE UP (ref 8.4–10.5)
CALCIUM SERPL-MCNC: 8.8 MG/DL — SIGNIFICANT CHANGE UP (ref 8.4–10.5)
CHLORIDE SERPL-SCNC: 109 MMOL/L — HIGH (ref 96–108)
CHLORIDE SERPL-SCNC: 109 MMOL/L — HIGH (ref 96–108)
CO2 SERPL-SCNC: 25 MMOL/L — SIGNIFICANT CHANGE UP (ref 22–31)
CO2 SERPL-SCNC: 25 MMOL/L — SIGNIFICANT CHANGE UP (ref 22–31)
CREAT SERPL-MCNC: 0.94 MG/DL — SIGNIFICANT CHANGE UP (ref 0.5–1.3)
CREAT SERPL-MCNC: 0.94 MG/DL — SIGNIFICANT CHANGE UP (ref 0.5–1.3)
EGFR: 81 ML/MIN/1.73M2 — SIGNIFICANT CHANGE UP
EGFR: 81 ML/MIN/1.73M2 — SIGNIFICANT CHANGE UP
GLUCOSE SERPL-MCNC: 107 MG/DL — HIGH (ref 70–99)
GLUCOSE SERPL-MCNC: 107 MG/DL — HIGH (ref 70–99)
HCT VFR BLD CALC: 44.5 % — SIGNIFICANT CHANGE UP (ref 39–50)
HCT VFR BLD CALC: 44.5 % — SIGNIFICANT CHANGE UP (ref 39–50)
HGB BLD-MCNC: 14.5 G/DL — SIGNIFICANT CHANGE UP (ref 13–17)
HGB BLD-MCNC: 14.5 G/DL — SIGNIFICANT CHANGE UP (ref 13–17)
HGB BLDA-MCNC: 13.4 G/DL — SIGNIFICANT CHANGE UP (ref 12.6–17.4)
HGB BLDA-MCNC: 13.4 G/DL — SIGNIFICANT CHANGE UP (ref 12.6–17.4)
MCHC RBC-ENTMCNC: 31.5 PG — SIGNIFICANT CHANGE UP (ref 27–34)
MCHC RBC-ENTMCNC: 31.5 PG — SIGNIFICANT CHANGE UP (ref 27–34)
MCHC RBC-ENTMCNC: 32.6 GM/DL — SIGNIFICANT CHANGE UP (ref 32–36)
MCHC RBC-ENTMCNC: 32.6 GM/DL — SIGNIFICANT CHANGE UP (ref 32–36)
MCV RBC AUTO: 96.7 FL — SIGNIFICANT CHANGE UP (ref 80–100)
MCV RBC AUTO: 96.7 FL — SIGNIFICANT CHANGE UP (ref 80–100)
NRBC # BLD: 0 /100 WBCS — SIGNIFICANT CHANGE UP (ref 0–0)
NRBC # BLD: 0 /100 WBCS — SIGNIFICANT CHANGE UP (ref 0–0)
OXYHGB MFR BLDA: 91.8 % — SIGNIFICANT CHANGE UP (ref 90–95)
OXYHGB MFR BLDA: 91.8 % — SIGNIFICANT CHANGE UP (ref 90–95)
PLATELET # BLD AUTO: 209 K/UL — SIGNIFICANT CHANGE UP (ref 150–400)
PLATELET # BLD AUTO: 209 K/UL — SIGNIFICANT CHANGE UP (ref 150–400)
POTASSIUM SERPL-MCNC: 4.2 MMOL/L — SIGNIFICANT CHANGE UP (ref 3.5–5.3)
POTASSIUM SERPL-MCNC: 4.2 MMOL/L — SIGNIFICANT CHANGE UP (ref 3.5–5.3)
POTASSIUM SERPL-SCNC: 4.2 MMOL/L — SIGNIFICANT CHANGE UP (ref 3.5–5.3)
POTASSIUM SERPL-SCNC: 4.2 MMOL/L — SIGNIFICANT CHANGE UP (ref 3.5–5.3)
RBC # BLD: 4.6 M/UL — SIGNIFICANT CHANGE UP (ref 4.2–5.8)
RBC # BLD: 4.6 M/UL — SIGNIFICANT CHANGE UP (ref 4.2–5.8)
RBC # FLD: 13.7 % — SIGNIFICANT CHANGE UP (ref 10.3–14.5)
RBC # FLD: 13.7 % — SIGNIFICANT CHANGE UP (ref 10.3–14.5)
SAO2 % BLDA: 93.2 % — LOW (ref 94–98)
SAO2 % BLDA: 93.2 % — LOW (ref 94–98)
SODIUM SERPL-SCNC: 145 MMOL/L — SIGNIFICANT CHANGE UP (ref 135–145)
SODIUM SERPL-SCNC: 145 MMOL/L — SIGNIFICANT CHANGE UP (ref 135–145)
WBC # BLD: 6.91 K/UL — SIGNIFICANT CHANGE UP (ref 3.8–10.5)
WBC # BLD: 6.91 K/UL — SIGNIFICANT CHANGE UP (ref 3.8–10.5)
WBC # FLD AUTO: 6.91 K/UL — SIGNIFICANT CHANGE UP (ref 3.8–10.5)
WBC # FLD AUTO: 6.91 K/UL — SIGNIFICANT CHANGE UP (ref 3.8–10.5)

## 2023-12-06 PROCEDURE — C1894: CPT

## 2023-12-06 PROCEDURE — 93005 ELECTROCARDIOGRAM TRACING: CPT

## 2023-12-06 PROCEDURE — 82803 BLOOD GASES ANY COMBINATION: CPT

## 2023-12-06 PROCEDURE — 93460 R&L HRT ART/VENTRICLE ANGIO: CPT | Mod: 26

## 2023-12-06 PROCEDURE — 85027 COMPLETE CBC AUTOMATED: CPT

## 2023-12-06 PROCEDURE — C1769: CPT

## 2023-12-06 PROCEDURE — 93010 ELECTROCARDIOGRAM REPORT: CPT

## 2023-12-06 PROCEDURE — 80048 BASIC METABOLIC PNL TOTAL CA: CPT

## 2023-12-06 PROCEDURE — 93460 R&L HRT ART/VENTRICLE ANGIO: CPT

## 2023-12-06 PROCEDURE — C1887: CPT

## 2023-12-06 PROCEDURE — 99152 MOD SED SAME PHYS/QHP 5/>YRS: CPT

## 2023-12-06 RX ORDER — METOPROLOL TARTRATE 50 MG
1 TABLET ORAL
Refills: 0 | DISCHARGE

## 2023-12-06 RX ORDER — CLOPIDOGREL BISULFATE 75 MG/1
1 TABLET, FILM COATED ORAL
Refills: 0 | DISCHARGE

## 2023-12-06 RX ORDER — PREDNISOLONE 5 MG
1 TABLET ORAL
Refills: 0 | DISCHARGE

## 2023-12-06 NOTE — H&P CARDIOLOGY - NSICDXPASTSURGICALHX_GEN_ALL_CORE_FT
PAST SURGICAL HISTORY:  History of parathyroid surgery     S/P cardiac cath     S/P colon resection

## 2023-12-06 NOTE — ASU DISCHARGE PLAN (ADULT/PEDIATRIC) - NS MD DC FALL RISK RISK
For information on Fall & Injury Prevention, visit: [email or web address] OR  [email or web address] OR  https://www.cdc.gov/steadi/patient.html For information on Fall & Injury Prevention, visit: [email or web address]/[email or web address] OR  [email or web address]/[email or web address]-to-avoid-injury OR  https://www.cdc.gov/steadi/patient.html

## 2023-12-06 NOTE — H&P CARDIOLOGY - HISTORY OF PRESENT ILLNESS
This is a 84 yo male with PMH of CHF  Seen and evaluated by Dr Gooden   Presents here today for C/C  This is a 84 yo male, former smoker (2 ppd x 40-45 years, quit in 2005), with PMH HTN, HLD, of Arteriosclerotic heart disease, Covid infection 4/2021, Pneumonia in 5/2023 ( f/u with pulm Dr Alcazar, [facility]), PMR on steroids ( followed by rheum Dr Whitehead [facility]), Diverticulitis, Gilbert's syndrome, Nephrolithiasis,  Osteoarthritis, and Popliteal aneurysm ( f/u srugery Dr Steffen Briones).  Seen and evaluated by Dr Gooden fro dyspnea on exertion   Presents here today for RHC/LHC for further evaluation of symptoms.                             This is a 84 yo male, former smoker (2 ppd x 40-45 years, quit in 2005), with PMH HTN, HLD, of Arteriosclerotic heart disease, Covid infection 4/2021, Pneumonia in 5/2023 ( f/u with pulm Dr Alcazar, [facility]), PMR on steroids ( followed by rheum Dr Whitehead [facility]), Diverticulitis, Gilbert's syndrome, Nephrolithiasis,  Osteoarthritis, and Popliteal aneurysm ( f/u srugery Dr Steffen Briones).  Seen and evaluated by Dr Gooden fro dyspnea on exertion   Presents here today for RHC/LHC for further evaluation of symptoms.                             This is a 84 yo male, former smoker (2 ppd x 40-45 years, quit in 2005), with PMH of MI in 2007 s/p X 1 SABA,  HTN, HLD, Arteriosclerotic heart disease, Covid infection 4/2021, Pneumonia in 5/2023 ( f/u with pulm Dr Alcazar, [facility]), PMR on steroids ( followed by rheum Dr Whitehead [facility]), Diverticulitis, Gilbert's syndrome, Nephrolithiasis,  Osteoarthritis, and Popliteal aneurysm ( f/u srugery Dr Steffen Briones).  Seen and evaluated by Dr Gooden for dyspnea on exertion and LE edema,   Presents here today for RHC/LHC for further evaluation of symptoms.                             This is a 84 yo male, former smoker (2 ppd x 40-45 years, quit in 2005), with PMH of MI in 2007 s/p X 1 SABA,  HTN, HLD, Arteriosclerotic heart disease, Covid infection 4/2021, Pneumonia in 5/2023 ( f/u with pulm Dr Alcazar, [facility]), PMR on steroids ( followed by rheum Dr Whitehead [facility]), Diverticulitis, Gilbert's syndrome, Nephrolithiasis,  Osteoarthritis, and Popliteal aneurysm ( f/u srugery Dr Steffen Briones).  Seen and evaluated by Dr Gooden for dyspnea on exertion and LE edema,   Presents here today for RHC/LHC for further evaluation of symptoms.                             This is a 82 yo male, [place], former smoker (2 ppd x 40-45 years, quit in 2005), accompanied by daughter, with PMH of MI in 2007 s/p X 1 SABA,  HTN, HLD, Arteriosclerotic heart disease, Covid infection 4/2021, Pneumonia in 5/2023 ( f/u with pulm Dr Alcazar, [facility]), [facility] Diverticulitis, Gilbert's syndrome, Nephrolithiasis,  Osteoarthritis, and Popliteal aneurysm ( f/u srugery Dr Steffen Briones).  Seen and evaluated by Dr Gooden for dyspnea on exertion and new LE edema, was temporarily on diuretics now completed course, had ECHO in outpt cards office that showed new HF ( as per daughter and patient, no echo report available for review).   Presents here today for RHC/LHC for further evaluation of symptoms.                             This is a 84 yo male, [place], former smoker (2 ppd x 40-45 years, quit in 2005), accompanied by daughter, with PMH of MI in 2007 s/p X 1 SABA,  HTN, HLD, Arteriosclerotic heart disease, Covid infection 4/2021, Pneumonia in 5/2023 ( f/u with pulm Dr Alcazar, [facility]), [facility] Diverticulitis, Gilbert's syndrome, Nephrolithiasis,  Osteoarthritis, and Popliteal aneurysm ( f/u srugery Dr Steffen Briones).  Seen and evaluated by Dr Gooden for dyspnea on exertion and new LE edema, was temporarily on diuretics now completed course, had ECHO in outpt cards office that showed new HF ( as per daughter and patient, no echo report available for review).   Presents here today for RHC/LHC for further evaluation of symptoms.

## 2023-12-06 NOTE — ASU PATIENT PROFILE, ADULT - FALL HARM RISK - UNIVERSAL INTERVENTIONS
Bed in lowest position, wheels locked, appropriate side rails in place/Call bell, personal items and telephone in reach/Instruct patient to call for assistance before getting out of bed or chair/Non-slip footwear when patient is out of bed/[place] to call system/Physically safe environment - no spills, clutter or unnecessary equipment/Purposeful Proactive Rounding/Room/bathroom lighting operational, light cord in reach Bed in lowest position, wheels locked, appropriate side rails in place/Call bell, personal items and telephone in reach/Instruct patient to call for assistance before getting out of bed or chair/Non-slip footwear when patient is out of bed/[place] to call system/Physically safe environment - no spills, clutter or unnecessary equipment/Purposeful Proactive Rounding/Room/bathroom lighting operational, light cord in reach

## 2023-12-06 NOTE — H&P CARDIOLOGY - NSICDXPASTMEDICALHX_GEN_ALL_CORE_FT
PAST MEDICAL HISTORY:  2019 novel coronavirus disease (COVID-19)     Gilbert syndrome     History of diverticulitis     HLD (hyperlipidemia)     HTN (hypertension)     Nephrolithiasis     Osteoarthritis     PMR (polymyalgia rheumatica)     Pneumonia due to COVID-19 virus     Popliteal aneurysm      PAST MEDICAL HISTORY:  2019 novel coronavirus disease (COVID-19)     Acute CHF     CAD (coronary artery disease)     Gilbert syndrome     History of diverticulitis     HLD (hyperlipidemia)     HTN (hypertension)     Myocardial infarct     Nephrolithiasis     Osteoarthritis     PMR (polymyalgia rheumatica)     Pneumonia due to COVID-19 virus     Popliteal aneurysm

## 2023-12-06 NOTE — ASU DISCHARGE PLAN (ADULT/PEDIATRIC) - CARE PROVIDER_API CALL
Roge Gooden  Cardiology  [address] [facility], [address] [place] [address]  Phone: [phone]  Fax: [phone]  Follow Up Time: Routine   Roge Gooden  Cardiology  [address] [facility], [address] [place] [address]  Phone: [phone]  Fax: [phone]  Follow Up Time: Routine

## 2023-12-06 NOTE — ASU DISCHARGE PLAN (ADULT/PEDIATRIC) - ASU DC SPECIAL INSTRUCTIONSFT
Wound Care:   the day AFTER your procedure remove bandage GENTTLY, and clean using  mild soap and gentle warm, water stream, pat dry. leave OPEN to air. YOU MAY SHOWER   DO NOT apply lotions, creams, ointments, powder, parfumes to your incision site  DO NOT SOAK your site for 1 week ( no baths, no pools, no tubs, etc...)  Check  your groin and /or wirst daily.A small amount of bruising, and soarness are normal    ACTIVITY: for 24 hours   - DO NOT DRIVE  - DO NOT make any important decisions or sign legal documents   - DO NOT operate heavy machinaries   - you may resume sexual activity in 48 hours, unless otherwise instructed by your cardiologist     If your procedure was done through the WRIST: for the NEXT 3DAYS:  - avoid pushing, pulling, with that affected wrist   - avoid repeated movement of that hand and wrist ( eg: typing, hammering)  - DO NOT LIFT anything more than 5 lbs     If your procedure was done through the GROIN: for the NEXT 5 DAYS  - Limit climbing stairs, DO NOT soak in bathtub or pool  - no strenous activities, pushing, pulling, straining  - Do not lift anything 10lbs or heavier     MEDICATION:   take your medications as explained ( see discharge paperwork)   If you received a STENT, you will be taking antiplatelet medications to KEEP YOUR STENT OPEN ( eg: Aspirin, Plavix, Brilinta, Effient, etc).  Take as prescribed DO NOT STOP taking them without consulting with your cardiologist first.     Follow heart healthy diet reccomended by your doctor, , if you smoke STOP SMOKING ( may call [phone] for center of tobacco control if you need assistance)     CALL your doctor to make appointment in 2 WEEKS     ***CALL YOUR DOCTOR***  if you experience: fever, chills, body aches, or severe pain, swelling, redness, heat or yellow discharge at incision site  If you experience Bleeding or excruciating pain at the procedural site, sweliing ( golf ball size) at your procedural site  If you experience CHEST PAIN  If you experience extremity numbness, tingling, temperature change ( of your procedural site)   If you are unable to reach your doctor, you may contact:   -Cardiology Office at [facility] at [phone] or   - [facility] [phone] - [facility] [phone] Wound Care:   the day AFTER your procedure remove bandage GENTTLY, and clean using  mild soap and gentle warm, water stream, pat dry. leave OPEN to air. YOU MAY SHOWER   DO NOT apply lotions, creams, ointments, powder, parfumes to your incision site  DO NOT SOAK your site for 1 week ( no baths, no pools, no tubs, etc...)  Check  your groin and /or wirst daily.A small amount of bruising, and soarness are normal    ACTIVITY: for 24 hours   - DO NOT DRIVE  - DO NOT make any important decisions or sign legal documents   - DO NOT operate heavy machinaries   - you may resume sexual activity in 48 hours, unless otherwise instructed by your cardiologist     If your procedure was done through the WRIST: for the NEXT 3DAYS:  - avoid pushing, pulling, with that affected wrist   - avoid repeated movement of that hand and wrist ( eg: typing, hammering)  - DO NOT LIFT anything more than 5 lbs     If your procedure was done through the GROIN: for the NEXT 5 DAYS  - Limit climbing stairs, DO NOT soak in bathtub or pool  - no strenous activities, pushing, pulling, straining  - Do not lift anything 10lbs or heavier     MEDICATION:   take your medications as explained ( see discharge paperwork)   If you received a STENT, you will be taking antiplatelet medications to KEEP YOUR STENT OPEN ( eg: Aspirin, Plavix, Brilinta, Effient, etc).  Take as prescribed DO NOT STOP taking them without consulting with your cardiologist first.     Follow heart healthy diet reccomended by your doctor, , if you smoke STOP SMOKING ( may call [phone] for center of tobacco control if you need assistance)     CALL your doctor to make appointment in 2 WEEKS     ***CALL YOUR DOCTOR***  if you experience: fever, chills, body aches, or severe pain, swelling, redness, heat or yellow discharge at incision site  If you experience Bleeding or excruciating pain at the procedural site, sweliing ( golf ball size) at your procedural site  If you experience CHEST PAIN  If you experience extremity numbness, tingling, temperature change ( of your procedural site)   If you are unable to reach your doctor, you may contact:   -Cardiology Office at [facility] at [phone] or   - [facility] [phone] - [facility] [phone]

## 2023-12-08 LAB
HGB FLD-MCNC: 13.4 G/DL — SIGNIFICANT CHANGE UP (ref 12.6–17.4)
HGB FLD-MCNC: 13.4 G/DL — SIGNIFICANT CHANGE UP (ref 12.6–17.4)
OXYHGB MFR BLDMV: 69.1 % — LOW (ref 90–95)
OXYHGB MFR BLDMV: 69.1 % — LOW (ref 90–95)
SAO2 % BLD: 70.2 % — SIGNIFICANT CHANGE UP (ref 60–90)
SAO2 % BLD: 70.2 % — SIGNIFICANT CHANGE UP (ref 60–90)

## 2023-12-28 ENCOUNTER — APPOINTMENT (OUTPATIENT)
Dept: RHEUMATOLOGY | Facility: CLINIC | Age: 83
End: 2023-12-28
Payer: MEDICARE

## 2023-12-28 VITALS
WEIGHT: 217 LBS | DIASTOLIC BLOOD PRESSURE: 68 MMHG | BODY MASS INDEX: 36.15 KG/M2 | HEART RATE: 60 BPM | OXYGEN SATURATION: 94 % | TEMPERATURE: 97 F | HEIGHT: 65 IN | SYSTOLIC BLOOD PRESSURE: 108 MMHG

## 2023-12-28 PROBLEM — U07.1 COVID-19: Chronic | Status: ACTIVE | Noted: 2023-12-06

## 2023-12-28 PROBLEM — E80.4 GILBERT SYNDROME: Chronic | Status: ACTIVE | Noted: 2023-12-06

## 2023-12-28 PROBLEM — E78.5 HYPERLIPIDEMIA, UNSPECIFIED: Chronic | Status: ACTIVE | Noted: 2023-12-06

## 2023-12-28 PROBLEM — I10 ESSENTIAL (PRIMARY) HYPERTENSION: Chronic | Status: ACTIVE | Noted: 2023-12-06

## 2023-12-28 PROBLEM — I50.9 HEART FAILURE, UNSPECIFIED: Chronic | Status: ACTIVE | Noted: 2023-12-06

## 2023-12-28 PROBLEM — I72.4 ANEURYSM OF ARTERY OF LOWER EXTREMITY: Chronic | Status: ACTIVE | Noted: 2023-12-06

## 2023-12-28 PROBLEM — M19.90 UNSPECIFIED OSTEOARTHRITIS, UNSPECIFIED SITE: Chronic | Status: ACTIVE | Noted: 2023-12-06

## 2023-12-28 PROBLEM — Z87.19 PERSONAL HISTORY OF OTHER DISEASES OF THE DIGESTIVE SYSTEM: Chronic | Status: ACTIVE | Noted: 2023-12-06

## 2023-12-28 PROBLEM — I21.9 ACUTE MYOCARDIAL INFARCTION, UNSPECIFIED: Chronic | Status: ACTIVE | Noted: 2023-12-06

## 2023-12-28 PROBLEM — M35.3 POLYMYALGIA RHEUMATICA: Chronic | Status: ACTIVE | Noted: 2023-12-06

## 2023-12-28 PROBLEM — N20.0 CALCULUS OF KIDNEY: Chronic | Status: ACTIVE | Noted: 2023-12-06

## 2023-12-28 PROBLEM — I25.10 ATHEROSCLEROTIC HEART DISEASE OF NATIVE CORONARY ARTERY WITHOUT ANGINA PECTORIS: Chronic | Status: ACTIVE | Noted: 2023-12-06

## 2023-12-28 PROCEDURE — 99214 OFFICE O/P EST MOD 30 MIN: CPT

## 2023-12-29 ENCOUNTER — APPOINTMENT (OUTPATIENT)
Dept: PULMONOLOGY | Facility: CLINIC | Age: 83
End: 2023-12-29
Payer: MEDICARE

## 2023-12-29 VITALS — BODY MASS INDEX: 37.1 KG/M2 | WEIGHT: 220 LBS | HEIGHT: 64.5 IN

## 2023-12-29 VITALS
OXYGEN SATURATION: 97 % | RESPIRATION RATE: 16 BRPM | HEART RATE: 61 BPM | DIASTOLIC BLOOD PRESSURE: 66 MMHG | SYSTOLIC BLOOD PRESSURE: 116 MMHG

## 2023-12-29 PROCEDURE — 85018 HEMOGLOBIN: CPT | Mod: QW

## 2023-12-29 PROCEDURE — 94729 DIFFUSING CAPACITY: CPT

## 2023-12-29 PROCEDURE — 99214 OFFICE O/P EST MOD 30 MIN: CPT | Mod: 25

## 2023-12-29 PROCEDURE — 94010 BREATHING CAPACITY TEST: CPT

## 2023-12-29 PROCEDURE — 94727 GAS DIL/WSHOT DETER LNG VOL: CPT

## 2023-12-29 RX ORDER — ALBUTEROL SULFATE 5 MG/ML
(5 MG/ML) SOLUTION, NON-ORAL INHALATION
Refills: 0 | Status: ACTIVE | COMMUNITY

## 2023-12-29 RX ORDER — GUAIFENESIN 100 MG/5ML
LIQUID ORAL
Refills: 0 | Status: ACTIVE | COMMUNITY

## 2023-12-29 NOTE — PHYSICAL EXAM
[No Acute Distress] : no acute distress [Low Lying Soft Palate] : low lying soft palate [Elongated Uvula] : elongated uvula [Normal Appearance] : normal appearance [Supple] : supple [Normal Rate/Rhythm] : normal rate/rhythm [Normal S1, S2] : normal s1, s2 [No Edema] : no edema [No Murmurs] : no murmurs [No Resp Distress] : no resp distress [No Acc Muscle Use] : no acc muscle use [Normal Rhythm and Effort] : normal rhythm and effort [Clear to Auscultation Bilaterally] : clear to auscultation bilaterally [No Abnormalities] : no abnormalities [Benign] : benign [Not Tender] : not tender [Soft] : soft [No Clubbing] : no clubbing [1+ Pitting] : 1+ pitting [No Focal Deficits] : no focal deficits [Oriented x3] : oriented x3

## 2023-12-29 NOTE — REVIEW OF SYSTEMS
[Postnasal Drip] : postnasal drip [Cough] : cough [SOB on Exertion] : sob on exertion [Seasonal Allergies] : seasonal allergies [Obesity] : obesity [Negative] : Psychiatric [Edema] : edema

## 2023-12-29 NOTE — END OF VISIT
[Time Spent: ___ minutes] : I have spent [unfilled] minutes of time on the encounter. [TextEntry] : Discussed with pt at length regarding obesity, smoking hx, cough, prior Covid infection, prior pneumonia, need for home O2; reviewed w/u with pt as above

## 2023-12-29 NOTE — REASON FOR VISIT
[Follow-Up - From Hospitalization] : a follow-up visit after a recent hospitalization [Cough] : cough [Shortness of Breath] : shortness of breath [Wheezing] : wheezing [Obesity] : obesity [TextBox_44] : Prior Covid infection

## 2023-12-29 NOTE — HISTORY OF PRESENT ILLNESS
[Currently Experiencing] : The patient is currently experiencing symptoms. [Dyspnea] : dyspnea [Follow-Up - Routine Clinic] : a routine clinic follow-up of [TextBox_4] : Former smoker of up to 2 ppd x 40-45 years, quit in 2005. S/p covid infection 4/2021. S/p pneumonia in 5/2023. He was d/c'd on home O2 which he still has but no longer uses. Pt denies significant sleep complaints though occasionally will take a nap during the day but not unintentionally. On steroids (4 mg down from 20 mg) for PMR - followed by rheum. Pt recently hospitalized at [facility] for "sepsis" and was treated with abx. Pt improved but c/o increased SOBOE.

## 2023-12-29 NOTE — DISCUSSION/SUMMARY
[FreeTextEntry1] : #1. PFTs c/w mild restriction. #2. Trial of Breo 100 for SOBOE and wheeze though lung function without obstruction. reviewed inhaler technique and stressed importance of rinsing mouth and throat after inhaler use. #3. Diet and exercise for weight loss. #4. SOBOE is likely at least somewhat related to weight or deconditioning.  #5. Continue Albuterol as needed for now for wheeze and exertional SOB. #6. Mucinex as needed for cough. #7. Evaluate continued need for home O2 with next visit but does not use any longer. #8. CXR from 9/28/23 to assess cough was clear. #9. Pt had both Covid vaccines and s/p Covid infection. #10. Pt denies significant sleep complaints.  #11. F/u in 2 months with isaac to assess response to Breo 100.  The patient expressed understanding and agreement with the above recommendations/plan and accepts responsibility to be compliant with recommended testing, therapies, and f/u visits. All relevant questions and concerns were addressed.  Discussed above with patient and daughter who was also present.

## 2023-12-29 NOTE — PROCEDURE
[FreeTextEntry1] : PFTs 12/29/23 - Mildly reduced FVC with normal FEV1 without obstruction, mildly reduced lung volumes and mildly reduced diffusion capacity which corrected for alveolar volume.

## 2023-12-29 NOTE — CONSULT LETTER
[Dear  ___] : Dear  [unfilled], [Consult Letter:] : I had the pleasure of evaluating your patient, [unfilled]. [Please see my note below.] : Please see my note below. [Consult Closing:] : Thank you very much for allowing me to participate in the care of this patient.  If you have any questions, please do not hesitate to contact me. [Sincerely,] : Sincerely, [FreeTextEntry3] : Cosmo Alcazar MD, FCCP, D. ABSM Pulmonary and Sleep Medicine [facility] Pulmonary and Sleep Medicine at [place]

## 2024-01-02 RX ORDER — FLUTICASONE FUROATE AND VILANTEROL TRIFENATATE 100; 25 UG/1; UG/1
100-25 POWDER RESPIRATORY (INHALATION)
Qty: 1 | Refills: 3 | Status: DISCONTINUED | COMMUNITY
Start: 2023-12-29 | End: 2024-01-02

## 2024-02-15 ENCOUNTER — APPOINTMENT (OUTPATIENT)
Dept: CARDIOLOGY | Facility: CLINIC | Age: 84
End: 2024-02-15
Payer: MEDICARE

## 2024-02-15 DIAGNOSIS — I72.4 ANEURYSM OF ARTERY OF LOWER EXTREMITY: ICD-10-CM

## 2024-02-15 DIAGNOSIS — Z86.69 PERSONAL HISTORY OF OTHER DISEASES OF THE NERVOUS SYSTEM AND SENSE ORGANS: ICD-10-CM

## 2024-02-15 DIAGNOSIS — R60.0 LOCALIZED EDEMA: ICD-10-CM

## 2024-02-15 PROCEDURE — 99215 OFFICE O/P EST HI 40 MIN: CPT

## 2024-02-15 PROCEDURE — G2211 COMPLEX E/M VISIT ADD ON: CPT

## 2024-02-15 PROCEDURE — 93000 ELECTROCARDIOGRAM COMPLETE: CPT

## 2024-02-15 RX ORDER — PREDNISONE 5 MG/1
5 TABLET ORAL
Qty: 180 | Refills: 0 | Status: DISCONTINUED | COMMUNITY
Start: 2023-03-10 | End: 2024-02-15

## 2024-02-15 NOTE — ASSESSMENT
[FreeTextEntry1] : ECG: Sinus bradycardia at 57 bpm.  Inferior wall MI old No significant ST-T wave changes.  Laboratory data: ----12/1/2023-- Chol---172 HDL-----34 LDL------71 Trigs---425  Echocardiogram [place] 1/15/2024 Normal LV size and function with LVEF 53% Mild diastolic filling abnormality Moderate aortic insufficiency Mild mitral and tricuspid sufficiency Mild pulm hypertension  38 mm g Mildly dilated ascending aorta   4.0/3.9 cm  Cardiac catheterization 12/6/2023: LMCA: No disease LAD: 30% Circumflex: 30% Mid RCA: 40% PA pressure 34/17 mean 24 Pulmonary capillary wedge pressure mean 13 RA pressure mean 12  Impression: 1.  Syncope: Following the initiation of diuretic therapy, was likely orthostatic in nature.  Arrhythmia considered less likely.  2.  Pretibial edema, likely due to venous insufficiency and mildly elevated filling pressures  3.  CAD status post remote MI (2007) with mild to moderate nonobstructive disease by cath 12/6/2023  4.  Hyperlipidemia with elevated triglycerides  4.  Hypertension seems adequately controlled  5.  Mildly dilated ascending aorta 4 cm  6.  PAD history of popliteal artery aneurysm  7.  Recent subdural hematoma Plavix on hold.  Plan: 1.  Begin aspirin 81 mg after cleared by neurology.  2.  Will try to avoid diuretic therapy and use Jobst support stockings for the edema.  3.  Will await next lipid panel and make decision about appropriate lipid-lowering therapy  4.  Reviewed all the patient data with he and his daughter who accompanied him on this visit.  Clinical follow-up here will be in 3 to 4 months.

## 2024-02-15 NOTE — REVIEW OF SYSTEMS
[SOB] : shortness of breath [Chest Discomfort] : no chest discomfort [Lower Ext Edema] : lower extremity edema [Leg Claudication] : no intermittent leg claudication [Orthopnea] : no orthopnea [Syncope] : syncope [Negative] : Heme/Lymph

## 2024-02-15 NOTE — HISTORY OF PRESENT ILLNESS
[FreeTextEntry1] : Patient with the aforementioned history had pneumonia in the fall 2023.  Following hospitalization he was found to have bilateral lower extremity edema treated with diuretic therapy. January after a week of feeling "a bit off", patient had a syncopal event walking into his house striking his head and had SDH.  Details are not available.  Previously on Plavix which was discontinued at that time.  He has been off diuretic therapy since then.  He denies any chest pain or significant shortness of breath. No PND, orthopnea.  No recurrence of syncope.  Cardiac risk factors include: Tobacco stopping 19 years ago History of hyperlipidemia with mostly hypertriglyceridemia History of hypertension.  Cardiac catheterization 12/6/2023 demonstrated mild to moderate multivessel nonobstructive disease.

## 2024-02-15 NOTE — REASON FOR VISIT
[FreeTextEntry1] : 83-year-old male presents here to establish cardiology care. The patient has a somewhat extensive cardiac history as follows: 1. NonSTEMI 2007 with PCI of the mid circumflex 2.  Popliteal aneurysm 3.  Hypertension 4.  Hyperlipidemia/hypertriglyceridemia 4.  Syncope January 2024 with [place] hospitalization 5.  Recent onset of lower extremity edema.

## 2024-02-15 NOTE — PHYSICAL EXAM
[Normal Venous Pressure] : normal venous pressure [No Carotid Bruit] : no carotid bruit [No Cyanosis] : no cyanosis [No Clubbing] : no clubbing [No Varicosities] : no varicosities [Normal] : alert and oriented, normal memory [de-identified] : Normocephalic and atraumatic [de-identified] : Cardiac: Nl S1 S2 with a grade 1/6  ANDREY and no significant  gallops or rubs. [de-identified] : Few rhonchi at the left base [de-identified] : 1+ pretibial edema bilaterally

## 2024-02-20 ENCOUNTER — APPOINTMENT (OUTPATIENT)
Dept: PULMONOLOGY | Facility: CLINIC | Age: 84
End: 2024-02-20

## 2024-02-20 ENCOUNTER — APPOINTMENT (OUTPATIENT)
Dept: PULMONOLOGY | Facility: CLINIC | Age: 84
End: 2024-02-20
Payer: MEDICARE

## 2024-02-20 VITALS
RESPIRATION RATE: 16 BRPM | SYSTOLIC BLOOD PRESSURE: 112 MMHG | HEART RATE: 68 BPM | OXYGEN SATURATION: 96 % | DIASTOLIC BLOOD PRESSURE: 60 MMHG

## 2024-02-20 VITALS — BODY MASS INDEX: 37.39 KG/M2 | HEIGHT: 64 IN | WEIGHT: 219 LBS

## 2024-02-20 PROCEDURE — 94010 BREATHING CAPACITY TEST: CPT

## 2024-02-20 PROCEDURE — 99214 OFFICE O/P EST MOD 30 MIN: CPT | Mod: 25

## 2024-02-20 RX ORDER — MULTIVIT-MIN/IRON/FOLIC ACID/K 18-600-40
CAPSULE ORAL
Refills: 0 | Status: ACTIVE | COMMUNITY

## 2024-02-20 RX ORDER — THIAMINE HCL 100 MG
TABLET ORAL
Refills: 0 | Status: ACTIVE | COMMUNITY

## 2024-02-20 RX ORDER — CHOLECALCIFEROL (VITAMIN D3) 25 MCG
TABLET ORAL
Refills: 0 | Status: ACTIVE | COMMUNITY

## 2024-02-20 NOTE — COUNSELING
Pt is admitted for hyponatremia, BMPs q2-6.  Pending hypertonic saline. [Potential consequences of obesity discussed] : Potential consequences of obesity discussed [Benefits of weight loss discussed] : Benefits of weight loss discussed [Encouraged to increase physical activity] : Encouraged to increase physical activity

## 2024-02-20 NOTE — HISTORY OF PRESENT ILLNESS
[Dyspnea] : dyspnea [Follow-Up - Routine Clinic] : a routine clinic follow-up of [Excessive Daytime Sleepiness] : excessive daytime sleepiness [Sleepy When Sedentary] : sleepy when sedentary [Currently Experiencing] : The patient is currently experiencing symptoms. [None] : The patient is not currently being treated for this problem [TextBox_4] : Former smoker of up to 2 ppd x 40-45 years, quit in 2005. S/p covid infection 4/2021. S/p pneumonia in 5/2023. He was d/c'd on home O2 which he still has but no longer uses. Pt denies significant sleep complaints though occasionally will take a nap during the day but not unintentionally. Admits to mild SOBOE and now willing to undergo w/u for possible CAMILO. On steroids (4 mg down from 20 mg) for PMR - followed by rheum. Pt recently hospitalized at [facility] for "sepsis" and was treated with abx. Pt with recent hospitalization at [facility] after fall and was told of desaturation at night so now uses O2 with sleep and willing to obtain HST. Pt improved but c/o increased SOBOE.

## 2024-02-20 NOTE — DISCUSSION/SUMMARY
[FreeTextEntry1] : #1. PFTs c/w mild restriction. Repeat isaac again with mild restriction despite Breo therapy. #2. No improvement in lung function on trial of Breo 100 for SOBOE and wheeze though lung function without obstruction so will d/c Breo and observe off therapy.  #3. Diet and exercise for weight loss. #4. SOBOE is likely at least somewhat related to weight or deconditioning.  #5. Continue Albuterol as needed for now for wheeze and exertional SOB. #6. Mucinex as needed for cough. #7. Evaluate continued need for home O2 with next visit but does not use any longer except with sleep. #8. CXR from 9/28/23 to assess cough was clear. #9. Pt had both Covid vaccines and s/p Covid infection. #10. Pt denies significant sleep complaints.  #11. Home PSG to evaluate for possible CAMILO given some EDS which pt now admits to. #12. Consider PAP therapy for significant CAMILO. #13. F/u in 2 months with HST.  The patient expressed understanding and agreement with the above recommendations/plan and accepts responsibility to be compliant with recommended testing, therapies, and f/u visits. All relevant questions and concerns were addressed.  Discussed above with patient and son-in-law who was also present.

## 2024-02-20 NOTE — CONSULT LETTER
[Dear  ___] : Dear  [unfilled], [Consult Letter:] : I had the pleasure of evaluating your patient, [unfilled]. [Please see my note below.] : Please see my note below. [Consult Closing:] : Thank you very much for allowing me to participate in the care of this patient.  If you have any questions, please do not hesitate to contact me. [Sincerely,] : Sincerely, [FreeTextEntry3] : Cosmo Alcazar MD, FCCP, D. ABSM Pulmonary and Sleep Medicine [facility] Pulmonary and Sleep Medicine at [place]

## 2024-02-20 NOTE — PROCEDURE
[FreeTextEntry1] : PFTs 12/29/23 - Mildly reduced FVC with normal FEV1 without obstruction, mildly reduced lung volumes and mildly reduced diffusion capacity which corrected for alveolar volume.  [place] 2/20/24 - Mildly reduced FVC and FEV1 with obstruction without improvement despite Breo 200 therapy.

## 2024-02-20 NOTE — REVIEW OF SYSTEMS
[Postnasal Drip] : postnasal drip [Cough] : cough [SOB on Exertion] : sob on exertion [Edema] : edema [Seasonal Allergies] : seasonal allergies [Obesity] : obesity [Negative] : Psychiatric

## 2024-02-20 NOTE — REASON FOR VISIT
[Follow-Up - From Hospitalization] : a follow-up visit after a recent hospitalization [Cough] : cough [Shortness of Breath] : shortness of breath [Wheezing] : wheezing [Obesity] : obesity [Sleep Apnea] : sleep apnea [TextBox_44] : Prior Covid infection

## 2024-03-11 ENCOUNTER — OUTPATIENT (OUTPATIENT)
Dept: OUTPATIENT SERVICES | Facility: HOSPITAL | Age: 84
LOS: 1 days | End: 2024-03-11
Payer: MEDICARE

## 2024-03-11 DIAGNOSIS — Z90.49 ACQUIRED ABSENCE OF OTHER SPECIFIED PARTS OF DIGESTIVE TRACT: Chronic | ICD-10-CM

## 2024-03-11 DIAGNOSIS — G47.33 OBSTRUCTIVE SLEEP APNEA (ADULT) (PEDIATRIC): ICD-10-CM

## 2024-03-11 DIAGNOSIS — Z98.890 OTHER SPECIFIED POSTPROCEDURAL STATES: Chronic | ICD-10-CM

## 2024-03-11 PROCEDURE — 95800 SLP STDY UNATTENDED: CPT | Mod: 26

## 2024-03-11 PROCEDURE — 95800 SLP STDY UNATTENDED: CPT

## 2024-03-27 ENCOUNTER — APPOINTMENT (OUTPATIENT)
Dept: RHEUMATOLOGY | Facility: CLINIC | Age: 84
End: 2024-03-27
Payer: MEDICARE

## 2024-03-27 VITALS
SYSTOLIC BLOOD PRESSURE: 118 MMHG | HEART RATE: 69 BPM | WEIGHT: 218 LBS | TEMPERATURE: 98 F | DIASTOLIC BLOOD PRESSURE: 68 MMHG | HEIGHT: 64 IN | BODY MASS INDEX: 37.22 KG/M2 | OXYGEN SATURATION: 99 %

## 2024-03-27 PROCEDURE — 99213 OFFICE O/P EST LOW 20 MIN: CPT

## 2024-04-12 ENCOUNTER — APPOINTMENT (OUTPATIENT)
Dept: PULMONOLOGY | Facility: CLINIC | Age: 84
End: 2024-04-12
Payer: MEDICARE

## 2024-04-12 DIAGNOSIS — G47.34 IDIOPATHIC SLEEP RELATED NONOBSTRUCTIVE ALVEOLAR HYPOVENTILATION: ICD-10-CM

## 2024-04-12 DIAGNOSIS — Z87.891 PERSONAL HISTORY OF NICOTINE DEPENDENCE: ICD-10-CM

## 2024-04-12 DIAGNOSIS — R06.09 OTHER FORMS OF DYSPNEA: ICD-10-CM

## 2024-04-12 DIAGNOSIS — R06.02 SHORTNESS OF BREATH: ICD-10-CM

## 2024-04-12 DIAGNOSIS — Z87.898 PERSONAL HISTORY OF OTHER SPECIFIED CONDITIONS: ICD-10-CM

## 2024-04-12 DIAGNOSIS — G47.33 OBSTRUCTIVE SLEEP APNEA (ADULT) (PEDIATRIC): ICD-10-CM

## 2024-04-12 PROCEDURE — 99214 OFFICE O/P EST MOD 30 MIN: CPT

## 2024-04-12 PROCEDURE — G2211 COMPLEX E/M VISIT ADD ON: CPT

## 2024-04-12 NOTE — PHYSICAL EXAM
[No Acute Distress] : no acute distress [Supple] : supple [No Resp Distress] : no resp distress [No Acc Muscle Use] : no acc muscle use [Normal Rhythm and Effort] : normal rhythm and effort [Oriented x3] : oriented x3 [Normal Appearance] : normal appearance

## 2024-04-12 NOTE — REASON FOR VISIT
[Follow-Up - From Hospitalization] : a follow-up visit after a recent hospitalization [Sleep Apnea] : sleep apnea [Cough] : cough [Shortness of Breath] : shortness of breath [Wheezing] : wheezing [Obesity] : obesity [TextBox_44] : Prior Covid infection

## 2024-04-12 NOTE — RESULTS/DATA
[TextEntry] : CXR from 9/28/23 was clear. HST from 3/11/24 revealed mild CAMILO with an AHI of 8.7 though moderate in REM sleep and with some desaturation.

## 2024-04-12 NOTE — PROCEDURE
[FreeTextEntry1] : PFTs 12/29/23 - Mildly reduced FVC with normal FEV1 without obstruction, mildly reduced lung volumes and mildly reduced diffusion capacity which corrected for alveolar volume.  [place] 2/20/24 - Mildly reduced FVC and FEV1 with obstruction without improvement despite Breo 200 therapy.

## 2024-04-12 NOTE — HISTORY OF PRESENT ILLNESS
[Excessive Daytime Sleepiness] : excessive daytime sleepiness [Sleepy When Sedentary] : sleepy when sedentary [None] : The patient is not currently being treated for this problem [Follow-Up - Routine Clinic] : a routine clinic follow-up of [Currently Experiencing] : The patient is currently experiencing symptoms. [Dyspnea] : dyspnea [Home] : at home, [unfilled] , at the time of the visit. [Other Location: e.g. Home (Enter Location, City,State)___] : at [unfilled] [Family Member] : family member [Verbal consent obtained from patient] : the patient, [unfilled] [TextBox_4] : Former smoker of up to 2 ppd x 40-45 years, quit in 2005. S/p covid infection 4/2021. S/p pneumonia in 5/2023. He was d/c'd on home O2 which he still has but no longer uses and wants to return. Reports O2 sat consistently around 95% on RA at rest though he has not evaluated on exertion. Pt denies significant sleep complaints though occasionally will take a nap during the day but not unintentionally.  Admits to mild SOBOE but no other respiratory complaints. Was on steroids (4 mg down from 20 mg) for PMR - followed by rheum but is now off steroids since early 4/2024. Pt previously hospitalized at [facility] for "sepsis" and was treated with abx. Pt with recent hospitalization at [facility] after fall and was told of desaturation at night so was using O2 with sleep but no longer. He had a HST which revealed mild CAMILO with an AHI of 8.7 though moderate in REM sleep and with some desaturation. H/o CAD with one stent in 2007. Pt improved but c/o increased SOBOE.

## 2024-04-12 NOTE — DISCUSSION/SUMMARY
[FreeTextEntry1] : #1. PFTs c/w mild restriction. Repeat isaac again with mild restriction despite Breo therapy. #2. No improvement in lung function on trial of Breo 100 for SOBOE and wheeze though lung function without obstruction so d/c'd Breo and will continue observe off therapy. He reports no symptomatic worsening and in fact with resolution of wheeze and cough and only mild residual SOBOE. #3. Diet and exercise for weight loss. #4. SOBOE is likely at least somewhat related to weight or deconditioning.  #5. Continue Albuterol as needed for now for wheeze and exertional SOB. #6. Mucinex as needed for cough. #7. Pt to continue nocturnal O2 use with sleep given desaturations noted on HST. If pt decides to proceed with PAP therapy, then HST can be repeated with therapy to ensure adequate nocturnal O2 saturation but pt would like to hold off on therapy for CAMILO given mild degree of CAMILO and mild symptoms. Pt will d/w cardiology, however, given h/o CAD and prior stent placement. #8. CXR from 9/28/23 to assess cough was clear. Cough appears improved and wheeze has resolved. #9. Pt had both Covid vaccines and s/p Covid infection. #10. Home PSG with mild CAMILO as above with an AHI of 8.7 but with desaturations totaling nearly 1 hour for the night. #11. F/u in 3 months with PFTs.  The patient expressed understanding and agreement with the above recommendations/plan and accepts responsibility to be compliant with recommended testing, therapies, and f/u visits. All relevant questions and concerns were addressed.  Discussed above with patient and daughter who was also present.

## 2024-04-12 NOTE — CONSULT LETTER
[Dear  ___] : Dear  [unfilled], [Consult Letter:] : I had the pleasure of evaluating your patient, [unfilled]. [Please see my note below.] : Please see my note below. [Consult Closing:] : Thank you very much for allowing me to participate in the care of this patient.  If you have any questions, please do not hesitate to contact me. [Sincerely,] : Sincerely, [FreeTextEntry3] : Cosmo Alcazar MD, FCCP, D. ABSM Pulmonary and Sleep Medicine [facility] Pulmonary and Sleep Medicine at [place]

## 2024-05-17 ENCOUNTER — APPOINTMENT (OUTPATIENT)
Dept: CARDIOLOGY | Facility: CLINIC | Age: 84
End: 2024-05-17
Payer: MEDICARE

## 2024-05-17 VITALS
DIASTOLIC BLOOD PRESSURE: 60 MMHG | OXYGEN SATURATION: 94 % | RESPIRATION RATE: 16 BRPM | HEIGHT: 64 IN | SYSTOLIC BLOOD PRESSURE: 112 MMHG | HEART RATE: 62 BPM | WEIGHT: 217 LBS | BODY MASS INDEX: 37.05 KG/M2

## 2024-05-17 DIAGNOSIS — E78.5 HYPERLIPIDEMIA, UNSPECIFIED: ICD-10-CM

## 2024-05-17 DIAGNOSIS — E66.9 OBESITY, UNSPECIFIED: ICD-10-CM

## 2024-05-17 DIAGNOSIS — I25.10 ATHEROSCLEROTIC HEART DISEASE OF NATIVE CORONARY ARTERY W/OUT ANGINA PECTORIS: ICD-10-CM

## 2024-05-17 DIAGNOSIS — R60.0 LOCALIZED EDEMA: ICD-10-CM

## 2024-05-17 PROCEDURE — 99214 OFFICE O/P EST MOD 30 MIN: CPT

## 2024-05-17 PROCEDURE — G2211 COMPLEX E/M VISIT ADD ON: CPT

## 2024-05-17 PROCEDURE — 93000 ELECTROCARDIOGRAM COMPLETE: CPT

## 2024-05-17 RX ORDER — ASPIRIN 81 MG/1
81 TABLET, CHEWABLE ORAL
Qty: 90 | Refills: 0 | Status: ACTIVE | COMMUNITY
Start: 2024-05-17 | End: 1900-01-01

## 2024-05-17 RX ORDER — BUDESONIDE AND FORMOTEROL FUMARATE DIHYDRATE 160; 4.5 UG/1; UG/1
160-4.5 AEROSOL RESPIRATORY (INHALATION) TWICE DAILY
Qty: 3 | Refills: 3 | Status: DISCONTINUED | COMMUNITY
Start: 2024-01-02 | End: 2024-05-17

## 2024-05-17 RX ORDER — PREDNISONE 1 MG/1
1 TABLET ORAL
Qty: 120 | Refills: 0 | Status: DISCONTINUED | COMMUNITY
Start: 2023-04-28 | End: 2024-05-17

## 2024-05-17 NOTE — REASON FOR VISIT
[FreeTextEntry1] : 83-year-old male presents for cardiac re-eval: The patient has a somewhat extensive cardiac history as follows: 1. NonSTEMI 2007 with PCI of the mid circumflex 2.  Popliteal aneurysm 3.  Hypertension 4.  Hyperlipidemia/hypertriglyceridemia 4.  Syncope January 2024 with [place] hospitalization 5.  Recent onset of lower extremity edema.

## 2024-05-17 NOTE — REVIEW OF SYSTEMS
[SOB] : shortness of breath [Lower Ext Edema] : lower extremity edema [Syncope] : syncope [Negative] : Heme/Lymph [Chest Discomfort] : no chest discomfort [Leg Claudication] : no intermittent leg claudication [Orthopnea] : no orthopnea

## 2024-05-17 NOTE — ASSESSMENT
[FreeTextEntry1] : ECG: Sinus rhythm at 62.  Old inferior infarct.  No significant ST-T wave changes es.  Laboratory data: ----12/1/2023--3/18/24 Chol---172------170 HDL-----34-------33 LDL------71------87 Trigs---425------374  Echocardiogram [place] 1/15/2024 Normal LV size and function with LVEF 53% Mild diastolic filling abnormality Moderate aortic insufficiency Mild mitral and tricuspid sufficiency Mild pulm hypertension  38 mm g Mildly dilated ascending aorta   4.0/3.9 cm  Cardiac catheterization 12/6/2023: LMCA: No disease LAD: 30% Circumflex: 30% Mid RCA: 40% PA pressure 34/17 mean 24 Pulmonary capillary wedge pressure mean 13 RA pressure mean 12  Impression: 1.  Syncope: Following the initiation of diuretic therapy, was likely orthostatic in nature.  Arrhythmia considered less likely.  2.  Pretibial edema, likely due to venous insufficiency and mildly elevated filling pressures  3.  CAD status post remote MI (2007) with mild to moderate nonobstructive disease by cath 12/6/2023  4.  Hyperlipidemia with elevated triglycerides  4.  Hypertension seems adequately controlled  5.  Mildly dilated ascending aorta 4 cm  6.  PAD history of popliteal artery aneurysm  7.  Recent subdural hematoma Plavix on hold.  Plan: 1.  Begin aspirin 81 mg having been cleared by neurology.  2.  Will try to avoid diuretic therapy and use Jobst support stockings for the edema.  3.  Discussed the increased triglycerides and the LDL greater than target of 70.  Certainly, dietary indiscretions are playing a role.  4.  Reviewed all the patient data with he and his daughter who accompanied him on this visit.  Clinical follow-up here will be in 3 to 4 months.

## 2024-05-17 NOTE — HISTORY OF PRESENT ILLNESS
[FreeTextEntry1] : Patient had pneumonia in the fall 2023.  Following hospitalization he was found to have bilateral lower extremity edema treated with diuretic therapy. January after a week of feeling "a bit off", patient had a syncopal event walking into his house striking his head and had SDH.  Details are not available.  Previously on Plavix which was discontinued at that time.  He has been off diuretic therapy since then.  He denies any chest pain or significant shortness of breath. No PND, orthopnea.  No recurrence of syncope.  Cardiac risk factors include: Tobacco stopping 19 years ago History of hyperlipidemia with mostly hypertriglyceridemia History of hypertension.  Cardiac catheterization 12/6/2023 demonstrated mild to moderate multivessel nonobstructive disease.

## 2024-05-17 NOTE — PHYSICAL EXAM
[Normal Venous Pressure] : normal venous pressure [No Carotid Bruit] : no carotid bruit [No Cyanosis] : no cyanosis [No Clubbing] : no clubbing [No Varicosities] : no varicosities [Normal] : alert and oriented, normal memory [de-identified] : Normocephalic and atraumatic [de-identified] : Cardiac: Nl S1 S2 with a grade 1/6  ANDREY and no significant  gallops or rubs. [de-identified] : Few rhonchi at the left base [de-identified] : trace pretibial edema bilaterally

## 2024-06-27 ENCOUNTER — APPOINTMENT (OUTPATIENT)
Dept: RHEUMATOLOGY | Facility: CLINIC | Age: 84
End: 2024-06-27
Payer: MEDICARE

## 2024-06-27 VITALS
SYSTOLIC BLOOD PRESSURE: 94 MMHG | TEMPERATURE: 97.6 F | WEIGHT: 207 LBS | OXYGEN SATURATION: 94 % | DIASTOLIC BLOOD PRESSURE: 60 MMHG | HEART RATE: 65 BPM | BODY MASS INDEX: 35.34 KG/M2 | HEIGHT: 64 IN

## 2024-06-27 DIAGNOSIS — M25.511 PAIN IN RIGHT SHOULDER: ICD-10-CM

## 2024-06-27 PROCEDURE — 20610 DRAIN/INJ JOINT/BURSA W/O US: CPT | Mod: RT

## 2024-06-27 PROCEDURE — 99214 OFFICE O/P EST MOD 30 MIN: CPT | Mod: 25

## 2024-07-16 ENCOUNTER — APPOINTMENT (OUTPATIENT)
Dept: PULMONOLOGY | Facility: CLINIC | Age: 84
End: 2024-07-16
Payer: MEDICARE

## 2024-07-16 VITALS
HEART RATE: 55 BPM | WEIGHT: 204 LBS | BODY MASS INDEX: 34.83 KG/M2 | SYSTOLIC BLOOD PRESSURE: 120 MMHG | DIASTOLIC BLOOD PRESSURE: 54 MMHG | HEIGHT: 64 IN | RESPIRATION RATE: 16 BRPM | OXYGEN SATURATION: 96 %

## 2024-07-16 DIAGNOSIS — G47.33 OBSTRUCTIVE SLEEP APNEA (ADULT) (PEDIATRIC): ICD-10-CM

## 2024-07-16 DIAGNOSIS — G47.34 IDIOPATHIC SLEEP RELATED NONOBSTRUCTIVE ALVEOLAR HYPOVENTILATION: ICD-10-CM

## 2024-07-16 DIAGNOSIS — Z87.891 PERSONAL HISTORY OF NICOTINE DEPENDENCE: ICD-10-CM

## 2024-07-16 DIAGNOSIS — R06.02 SHORTNESS OF BREATH: ICD-10-CM

## 2024-07-16 DIAGNOSIS — E66.9 OBESITY, UNSPECIFIED: ICD-10-CM

## 2024-07-16 PROCEDURE — G2211 COMPLEX E/M VISIT ADD ON: CPT

## 2024-07-16 PROCEDURE — 99214 OFFICE O/P EST MOD 30 MIN: CPT

## 2024-07-16 RX ORDER — NIACIN 500 MG/1
TABLET ORAL
Refills: 0 | Status: ACTIVE | COMMUNITY

## 2024-09-20 ENCOUNTER — APPOINTMENT (OUTPATIENT)
Dept: CARDIOLOGY | Facility: CLINIC | Age: 84
End: 2024-09-20
Payer: MEDICARE

## 2024-09-20 VITALS
WEIGHT: 202 LBS | DIASTOLIC BLOOD PRESSURE: 70 MMHG | SYSTOLIC BLOOD PRESSURE: 112 MMHG | HEART RATE: 56 BPM | HEIGHT: 64 IN | RESPIRATION RATE: 16 BRPM | OXYGEN SATURATION: 97 % | BODY MASS INDEX: 34.49 KG/M2

## 2024-09-20 DIAGNOSIS — I25.10 ATHEROSCLEROTIC HEART DISEASE OF NATIVE CORONARY ARTERY W/OUT ANGINA PECTORIS: ICD-10-CM

## 2024-09-20 DIAGNOSIS — R06.02 SHORTNESS OF BREATH: ICD-10-CM

## 2024-09-20 DIAGNOSIS — G47.33 OBSTRUCTIVE SLEEP APNEA (ADULT) (PEDIATRIC): ICD-10-CM

## 2024-09-20 DIAGNOSIS — E66.9 OBESITY, UNSPECIFIED: ICD-10-CM

## 2024-09-20 DIAGNOSIS — E78.5 HYPERLIPIDEMIA, UNSPECIFIED: ICD-10-CM

## 2024-09-20 DIAGNOSIS — I72.4 ANEURYSM OF ARTERY OF LOWER EXTREMITY: ICD-10-CM

## 2024-09-20 PROCEDURE — 93000 ELECTROCARDIOGRAM COMPLETE: CPT

## 2024-09-20 PROCEDURE — 99214 OFFICE O/P EST MOD 30 MIN: CPT

## 2024-09-20 NOTE — PHYSICAL EXAM
[Normal Venous Pressure] : normal venous pressure [No Carotid Bruit] : no carotid bruit [No Cyanosis] : no cyanosis [No Clubbing] : no clubbing [No Varicosities] : no varicosities [Normal] : alert and oriented, normal memory [de-identified] : Normocephalic and atraumatic [de-identified] : Cardiac: Nl S1 S2 with a grade 1/6  ANDREY and no significant  gallops or rubs. [de-identified] : Few rhonchi at the bases [de-identified] : trace pretibial edema bilaterally

## 2024-09-20 NOTE — REASON FOR VISIT
[FreeTextEntry1] : 83-year-old male presents for cardiac re-eval:  The patient has a somewhat extensive cardiac history as follows:  1.  NonSTEMI 2007 with PCI of the mid circumflex 2.  Popliteal aneurysm 3.  Hypertension 4.  Hyperlipidemia/hypertriglyceridemia 4.  Syncope January 2024 with [facility] hospitalization 5.  Lower extremity edema.

## 2024-09-20 NOTE — ASSESSMENT
[FreeTextEntry1] : ECG: Sinus rhythm at 56  Old inferior infarct.  No significant ST-T wave changes es.  Laboratory data: ----12/1/2023--3/18/24 Chol---172------170 HDL-----34-------33 LDL------71------87 Trigs---425------374  Echocardiogram [place] 1/15/2024 Normal LV size and function with LVEF 53% Mild diastolic filling abnormality Moderate aortic insufficiency Mild mitral and tricuspid sufficiency Mild pulm hypertension  38 mm g Mildly dilated ascending aorta   4.0/3.9 cm  Cardiac catheterization 12/6/2023: LMCA: No disease LAD: 30% Circumflex: 30% Mid RCA: 40% PA pressure 34/17 mean 24 Pulmonary capillary wedge pressure mean 13 RA pressure mean 12  Impression: 1.  Syncope: Following the initiation of diuretic therapy, was likely orthostatic in nature.  Arrhythmia considered less likely.  2.  Pretibial edema, likely due to venous insufficiency and mildly elevated filling pressures.  Will avoid further diuretic therapy unless necessary.  3.  CAD status post remote MI (2007) with mild to moderate nonobstructive disease by cath 12/6/2023  4.  Hyperlipidemia with elevated triglycerides  4.  Hypertension seems adequately controlled  5.  Mildly dilated ascending aorta 4 cm  6.  PAD history of popliteal artery aneurysm  7.  Recent subdural hematoma Plavix on hold.  8.  COPD with chronic rhonchi that apparently are unchanged.  Plan: 1.  Continue aspirin 81 mg having been cleared by neurology.  2.  Will try to avoid diuretic therapy and use Jobst support stockings for the edema.  3.  Discussed the increased triglycerides and the LDL greater than target of 70.  Certainly, dietary indiscretions are playing a role.  Repeat lab work is pending through PMD.  4.  Reviewed all the patient data with he and his daughter who accompanied him on this visit.  Clinical follow-up here will be in 3 to 4 months.

## 2024-09-20 NOTE — HISTORY OF PRESENT ILLNESS
[FreeTextEntry1] : Pneumonia in the fall 2023>>>.  then developed bilateral lower extremity edema treated with diuretic therapy >>> January feeling "a bit off", patient had a syncopal event walking into his house striking his head and had SDH.  Details are not available.  Previously on Plavix which was discontinued at that time.  He has been off diuretic therapy since then.  He denies any chest pain or significant shortness of breath. No PND, orthopnea.  No recurrence of syncope.  Cardiac risk factors include: Tobacco stopping 19 years ago History of hyperlipidemia with mostly hypertriglyceridemia History of hypertension.  Cardiac catheterization 12/6/2023 - mild to moderate multivessel nonobstructive disease.

## 2024-09-20 NOTE — REVIEW OF SYSTEMS
[SOB] : shortness of breath [Lower Ext Edema] : lower extremity edema [Syncope] : syncope [Negative] : Heme/Lymph [Weight Loss (___ Lbs)] : [unfilled] ~Ulb weight loss [Chest Discomfort] : no chest discomfort [Leg Claudication] : no intermittent leg claudication [Orthopnea] : no orthopnea

## 2024-10-02 ENCOUNTER — APPOINTMENT (OUTPATIENT)
Dept: RHEUMATOLOGY | Facility: CLINIC | Age: 84
End: 2024-10-02
Payer: MEDICARE

## 2024-10-02 VITALS
OXYGEN SATURATION: 94 % | TEMPERATURE: 98.7 F | WEIGHT: 202 LBS | HEART RATE: 71 BPM | BODY MASS INDEX: 34.49 KG/M2 | HEIGHT: 64 IN | DIASTOLIC BLOOD PRESSURE: 70 MMHG | SYSTOLIC BLOOD PRESSURE: 110 MMHG

## 2024-10-02 DIAGNOSIS — R26.9 UNSPECIFIED ABNORMALITIES OF GAIT AND MOBILITY: ICD-10-CM

## 2024-10-02 DIAGNOSIS — D69.6 THROMBOCYTOPENIA, UNSPECIFIED: ICD-10-CM

## 2024-10-02 PROCEDURE — 99215 OFFICE O/P EST HI 40 MIN: CPT

## 2024-10-02 NOTE — HISTORY OF PRESENT ILLNESS
[FreeTextEntry1] : Doing very well from PMR point of view  Right shoulder pain resolved after steroid injection given last visit Denies any other joint pain, swelling or stiffness Denies any scalp pain, no temporal headache, no visual symptoms or sudden loss of vision, no jaw claudication No fevers, chills, night sweats Complains of abnormal gait, imbalance.  Has had 2 major falls in the last few months.  Most recent fall was in mid July when he fell down a few stairs and was admitted at [facility].  Has not seen neurology. Otherwise review of systems unchanged from prior visit

## 2024-10-02 NOTE — ASSESSMENT
[FreeTextEntry1] : 84-year-old male here today for a follow-up visit  Polymyalgia rheumatica: -Minimally symptomatic at this time, no GCA symptoms. Patient aware to go to the emergency room for any GCA symptoms -Status post prednisone taper from 3/20/2023 to 3/20/2024.   -Monitor labs.  Labs from 9/30/2024 reviewed with patient and his daughter.   Right shoulder pain: -Resolved after cortisone injection given last visit - Defers x-rays and PT at this time  Thrombocytopenia: - Noted initially in March 2024 and labs from PMD - Denies any symptoms - Hematology evaluation, referral given  Gait abnormality/imbalance: - Status post 2 falls, most recently mid July - Neurology evaluation, referral given - Agreeable to trial of PT, referral given  All questions and concerns addressed to the best possible ability, patient and his daughter verbalized understanding and are agreeable  Follow-up in 4 to 6 months or sooner as needed, labs before next visit.

## 2024-10-02 NOTE — HISTORY OF PRESENT ILLNESS
Provider E-Visit time total (minutes): 5   [FreeTextEntry1] : Doing very well from PMR point of view  Right shoulder pain resolved after steroid injection given last visit Denies any other joint pain, swelling or stiffness Denies any scalp pain, no temporal headache, no visual symptoms or sudden loss of vision, no jaw claudication No fevers, chills, night sweats Complains of abnormal gait, imbalance.  Has had 2 major falls in the last few months.  Most recent fall was in mid July when he fell down a few stairs and was admitted at [facility].  Has not seen neurology. Otherwise review of systems unchanged from prior visit

## 2024-10-21 ENCOUNTER — LABORATORY RESULT (OUTPATIENT)
Age: 84
End: 2024-10-21

## 2024-10-21 ENCOUNTER — NON-APPOINTMENT (OUTPATIENT)
Age: 84
End: 2024-10-21

## 2024-10-21 ENCOUNTER — APPOINTMENT (OUTPATIENT)
Dept: FAMILY MEDICINE | Facility: CLINIC | Age: 84
End: 2024-10-21
Payer: MEDICARE

## 2024-10-21 VITALS
DIASTOLIC BLOOD PRESSURE: 68 MMHG | TEMPERATURE: 97.8 F | OXYGEN SATURATION: 98 % | BODY MASS INDEX: 34.66 KG/M2 | HEART RATE: 57 BPM | WEIGHT: 203 LBS | HEIGHT: 64 IN | SYSTOLIC BLOOD PRESSURE: 130 MMHG

## 2024-10-21 DIAGNOSIS — G47.00 INSOMNIA, UNSPECIFIED: ICD-10-CM

## 2024-10-21 DIAGNOSIS — Z00.00 ENCOUNTER FOR GENERAL ADULT MEDICAL EXAMINATION W/OUT ABNORMAL FINDINGS: ICD-10-CM

## 2024-10-21 DIAGNOSIS — Z13.29 ENCOUNTER FOR SCREENING FOR OTHER SUSPECTED ENDOCRINE DISORDER: ICD-10-CM

## 2024-10-21 DIAGNOSIS — Z13.228 ENCOUNTER FOR SCREENING FOR OTHER SUSPECTED ENDOCRINE DISORDER: ICD-10-CM

## 2024-10-21 DIAGNOSIS — E78.5 HYPERLIPIDEMIA, UNSPECIFIED: ICD-10-CM

## 2024-10-21 DIAGNOSIS — R41.89 OTHER SYMPTOMS AND SIGNS INVOLVING COGNITIVE FUNCTIONS AND AWARENESS: ICD-10-CM

## 2024-10-21 DIAGNOSIS — Z12.5 ENCOUNTER FOR SCREENING FOR MALIGNANT NEOPLASM OF PROSTATE: ICD-10-CM

## 2024-10-21 DIAGNOSIS — R29.6 REPEATED FALLS: ICD-10-CM

## 2024-10-21 DIAGNOSIS — Z13.0 ENCOUNTER FOR SCREENING FOR OTHER SUSPECTED ENDOCRINE DISORDER: ICD-10-CM

## 2024-10-21 PROCEDURE — 99213 OFFICE O/P EST LOW 20 MIN: CPT

## 2024-10-21 PROCEDURE — G0439: CPT

## 2024-10-22 LAB
25(OH)D3 SERPL-MCNC: 29.9 NG/ML
ALBUMIN SERPL ELPH-MCNC: 4 G/DL
ALP BLD-CCNC: 83 U/L
ALT SERPL-CCNC: 13 U/L
ANION GAP SERPL CALC-SCNC: 14 MMOL/L
AST SERPL-CCNC: 21 U/L
BASOPHILS # BLD AUTO: 0.08 K/UL
BASOPHILS NFR BLD AUTO: 1 %
BILIRUB SERPL-MCNC: 1.4 MG/DL
BUN SERPL-MCNC: 14 MG/DL
CALCIUM SERPL-MCNC: 9 MG/DL
CHLORIDE SERPL-SCNC: 106 MMOL/L
CHOLEST SERPL-MCNC: 112 MG/DL
CO2 SERPL-SCNC: 25 MMOL/L
CREAT SERPL-MCNC: 1.11 MG/DL
EGFR: 66 ML/MIN/1.73M2
EOSINOPHIL # BLD AUTO: 0.14 K/UL
EOSINOPHIL NFR BLD AUTO: 1.8 %
ESTIMATED AVERAGE GLUCOSE: 114 MG/DL
FOLATE SERPL-MCNC: 5.1 NG/ML
GLUCOSE SERPL-MCNC: 85 MG/DL
HBA1C MFR BLD HPLC: 5.6 %
HCT VFR BLD CALC: 46 %
HDLC SERPL-MCNC: 38 MG/DL
HGB BLD-MCNC: 14.4 G/DL
IMM GRANULOCYTES NFR BLD AUTO: 1.1 %
IRON SATN MFR SERPL: 43 %
IRON SERPL-MCNC: 116 UG/DL
LDLC SERPL CALC-MCNC: 30 MG/DL
LYMPHOCYTES # BLD AUTO: 1.72 K/UL
LYMPHOCYTES NFR BLD AUTO: 21.5 %
MAN DIFF?: NORMAL
MCHC RBC-ENTMCNC: 31.3 GM/DL
MCHC RBC-ENTMCNC: 31.4 PG
MCV RBC AUTO: 100.4 FL
MONOCYTES # BLD AUTO: 0.88 K/UL
MONOCYTES NFR BLD AUTO: 11 %
NEUTROPHILS # BLD AUTO: 5.09 K/UL
NEUTROPHILS NFR BLD AUTO: 63.6 %
NONHDLC SERPL-MCNC: 74 MG/DL
PLATELET # BLD AUTO: 133 K/UL
POTASSIUM SERPL-SCNC: 4.6 MMOL/L
PROT SERPL-MCNC: 6.3 G/DL
PSA SERPL-MCNC: 0.36 NG/ML
RBC # BLD: 4.58 M/UL
RBC # FLD: 14.9 %
SODIUM SERPL-SCNC: 145 MMOL/L
TIBC SERPL-MCNC: 268 UG/DL
TRIGL SERPL-MCNC: 298 MG/DL
TSH SERPL-ACNC: 4.72 UIU/ML
UIBC SERPL-MCNC: 153 UG/DL
VIT B12 SERPL-MCNC: 244 PG/ML
WBC # FLD AUTO: 8 K/UL

## 2024-11-06 ENCOUNTER — APPOINTMENT (OUTPATIENT)
Dept: PULMONOLOGY | Facility: CLINIC | Age: 84
End: 2024-11-06
Payer: MEDICARE

## 2024-11-06 VITALS
SYSTOLIC BLOOD PRESSURE: 128 MMHG | RESPIRATION RATE: 16 BRPM | OXYGEN SATURATION: 95 % | DIASTOLIC BLOOD PRESSURE: 72 MMHG | HEART RATE: 67 BPM

## 2024-11-06 DIAGNOSIS — R07.9 CHEST PAIN, UNSPECIFIED: ICD-10-CM

## 2024-11-06 DIAGNOSIS — R06.02 SHORTNESS OF BREATH: ICD-10-CM

## 2024-11-06 DIAGNOSIS — E66.9 OBESITY, UNSPECIFIED: ICD-10-CM

## 2024-11-06 DIAGNOSIS — G47.33 OBSTRUCTIVE SLEEP APNEA (ADULT) (PEDIATRIC): ICD-10-CM

## 2024-11-06 DIAGNOSIS — G47.34 IDIOPATHIC SLEEP RELATED NONOBSTRUCTIVE ALVEOLAR HYPOVENTILATION: ICD-10-CM

## 2024-11-06 DIAGNOSIS — Z87.891 PERSONAL HISTORY OF NICOTINE DEPENDENCE: ICD-10-CM

## 2024-11-06 DIAGNOSIS — G47.00 INSOMNIA, UNSPECIFIED: ICD-10-CM

## 2024-11-06 PROCEDURE — 99214 OFFICE O/P EST MOD 30 MIN: CPT | Mod: 25

## 2024-11-06 PROCEDURE — 94010 BREATHING CAPACITY TEST: CPT

## 2024-11-26 ENCOUNTER — OUTPATIENT (OUTPATIENT)
Dept: OUTPATIENT SERVICES | Facility: HOSPITAL | Age: 84
LOS: 1 days | End: 2024-11-26
Payer: MEDICARE

## 2024-11-26 DIAGNOSIS — Z98.890 OTHER SPECIFIED POSTPROCEDURAL STATES: Chronic | ICD-10-CM

## 2024-11-26 DIAGNOSIS — G47.33 OBSTRUCTIVE SLEEP APNEA (ADULT) (PEDIATRIC): ICD-10-CM

## 2024-11-26 DIAGNOSIS — Z90.49 ACQUIRED ABSENCE OF OTHER SPECIFIED PARTS OF DIGESTIVE TRACT: Chronic | ICD-10-CM

## 2024-11-26 PROCEDURE — 95800 SLP STDY UNATTENDED: CPT | Mod: 26

## 2024-11-26 PROCEDURE — 95800 SLP STDY UNATTENDED: CPT

## 2024-12-26 ENCOUNTER — APPOINTMENT (OUTPATIENT)
Dept: NEUROLOGY | Facility: CLINIC | Age: 84
End: 2024-12-26

## 2024-12-26 VITALS
HEIGHT: 64 IN | WEIGHT: 212 LBS | HEART RATE: 62 BPM | DIASTOLIC BLOOD PRESSURE: 68 MMHG | OXYGEN SATURATION: 93 % | SYSTOLIC BLOOD PRESSURE: 115 MMHG | BODY MASS INDEX: 36.19 KG/M2

## 2024-12-26 DIAGNOSIS — R29.6 REPEATED FALLS: ICD-10-CM

## 2024-12-26 DIAGNOSIS — R26.9 UNSPECIFIED ABNORMALITIES OF GAIT AND MOBILITY: ICD-10-CM

## 2024-12-26 DIAGNOSIS — R41.89 OTHER SYMPTOMS AND SIGNS INVOLVING COGNITIVE FUNCTIONS AND AWARENESS: ICD-10-CM

## 2024-12-26 PROCEDURE — 99205 OFFICE O/P NEW HI 60 MIN: CPT

## 2024-12-26 PROCEDURE — G2211 COMPLEX E/M VISIT ADD ON: CPT

## 2025-01-13 ENCOUNTER — APPOINTMENT (OUTPATIENT)
Dept: NEUROSURGERY | Facility: CLINIC | Age: 85
End: 2025-01-13
Payer: MEDICARE

## 2025-01-13 ENCOUNTER — NON-APPOINTMENT (OUTPATIENT)
Age: 85
End: 2025-01-13

## 2025-01-13 VITALS
BODY MASS INDEX: 36.19 KG/M2 | HEIGHT: 64 IN | DIASTOLIC BLOOD PRESSURE: 72 MMHG | OXYGEN SATURATION: 92 % | WEIGHT: 212 LBS | HEART RATE: 79 BPM | TEMPERATURE: 97.9 F | SYSTOLIC BLOOD PRESSURE: 119 MMHG

## 2025-01-13 DIAGNOSIS — G91.9 HYDROCEPHALUS, UNSPECIFIED: ICD-10-CM

## 2025-01-13 PROCEDURE — 99203 OFFICE O/P NEW LOW 30 MIN: CPT

## 2025-01-16 ENCOUNTER — APPOINTMENT (OUTPATIENT)
Dept: PULMONOLOGY | Facility: CLINIC | Age: 85
End: 2025-01-16
Payer: MEDICARE

## 2025-01-16 VITALS
HEART RATE: 64 BPM | BODY MASS INDEX: 35 KG/M2 | RESPIRATION RATE: 16 BRPM | HEIGHT: 64 IN | DIASTOLIC BLOOD PRESSURE: 62 MMHG | SYSTOLIC BLOOD PRESSURE: 120 MMHG | WEIGHT: 205 LBS | OXYGEN SATURATION: 96 %

## 2025-01-16 DIAGNOSIS — G47.33 OBSTRUCTIVE SLEEP APNEA (ADULT) (PEDIATRIC): ICD-10-CM

## 2025-01-16 DIAGNOSIS — R05.9 COUGH, UNSPECIFIED: ICD-10-CM

## 2025-01-16 DIAGNOSIS — G47.00 INSOMNIA, UNSPECIFIED: ICD-10-CM

## 2025-01-16 DIAGNOSIS — M35.3 POLYMYALGIA RHEUMATICA: ICD-10-CM

## 2025-01-16 DIAGNOSIS — R09.82 POSTNASAL DRIP: ICD-10-CM

## 2025-01-16 DIAGNOSIS — E66.9 OBESITY, UNSPECIFIED: ICD-10-CM

## 2025-01-16 DIAGNOSIS — R06.02 SHORTNESS OF BREATH: ICD-10-CM

## 2025-01-16 DIAGNOSIS — Z87.891 PERSONAL HISTORY OF NICOTINE DEPENDENCE: ICD-10-CM

## 2025-01-16 DIAGNOSIS — Z87.898 PERSONAL HISTORY OF OTHER SPECIFIED CONDITIONS: ICD-10-CM

## 2025-01-16 DIAGNOSIS — G47.34 IDIOPATHIC SLEEP RELATED NONOBSTRUCTIVE ALVEOLAR HYPOVENTILATION: ICD-10-CM

## 2025-01-16 PROCEDURE — G2211 COMPLEX E/M VISIT ADD ON: CPT

## 2025-01-16 PROCEDURE — 99214 OFFICE O/P EST MOD 30 MIN: CPT

## 2025-01-16 RX ORDER — ALBUTEROL SULFATE 90 UG/1
108 (90 BASE) INHALANT RESPIRATORY (INHALATION)
Qty: 1 | Refills: 2 | Status: ACTIVE | COMMUNITY
Start: 2025-01-16 | End: 1900-01-01

## 2025-02-06 ENCOUNTER — APPOINTMENT (OUTPATIENT)
Dept: MRI IMAGING | Facility: CLINIC | Age: 85
End: 2025-02-06

## 2025-02-07 ENCOUNTER — APPOINTMENT (OUTPATIENT)
Dept: CARDIOLOGY | Facility: CLINIC | Age: 85
End: 2025-02-07
Payer: MEDICARE

## 2025-02-07 VITALS
OXYGEN SATURATION: 96 % | BODY MASS INDEX: 37.22 KG/M2 | HEIGHT: 64 IN | DIASTOLIC BLOOD PRESSURE: 80 MMHG | RESPIRATION RATE: 16 BRPM | SYSTOLIC BLOOD PRESSURE: 120 MMHG | HEART RATE: 60 BPM | WEIGHT: 218 LBS

## 2025-02-07 DIAGNOSIS — R60.0 LOCALIZED EDEMA: ICD-10-CM

## 2025-02-07 DIAGNOSIS — R06.02 SHORTNESS OF BREATH: ICD-10-CM

## 2025-02-07 DIAGNOSIS — G47.33 OBSTRUCTIVE SLEEP APNEA (ADULT) (PEDIATRIC): ICD-10-CM

## 2025-02-07 DIAGNOSIS — E78.1 PURE HYPERGLYCERIDEMIA: ICD-10-CM

## 2025-02-07 DIAGNOSIS — I25.10 ATHEROSCLEROTIC HEART DISEASE OF NATIVE CORONARY ARTERY W/OUT ANGINA PECTORIS: ICD-10-CM

## 2025-02-07 DIAGNOSIS — E66.9 OBESITY, UNSPECIFIED: ICD-10-CM

## 2025-02-07 DIAGNOSIS — I42.8 OTHER CARDIOMYOPATHIES: ICD-10-CM

## 2025-02-07 DIAGNOSIS — E78.5 HYPERLIPIDEMIA, UNSPECIFIED: ICD-10-CM

## 2025-02-07 PROCEDURE — 99214 OFFICE O/P EST MOD 30 MIN: CPT

## 2025-02-07 PROCEDURE — 93000 ELECTROCARDIOGRAM COMPLETE: CPT

## 2025-02-07 PROCEDURE — G2211 COMPLEX E/M VISIT ADD ON: CPT

## 2025-02-07 RX ORDER — OMEGA-3-ACID ETHYL ESTERS CAPSULES 1 G/1
1 CAPSULE, LIQUID FILLED ORAL
Qty: 180 | Refills: 3 | Status: ACTIVE | COMMUNITY
Start: 2025-02-07 | End: 1900-01-01

## 2025-02-25 NOTE — H&P CARDIOLOGY - PEDAL EDEMA SEVERITY
Quality 47: Advance Care Plan: Advance Care Planning discussed and documented; advance care plan or surrogate decision maker documented in the medical record. Detail Level: Detailed Quality 226: Preventive Care And Screening: Tobacco Use: Screening And Cessation Intervention: Patient screened for tobacco use and is an ex/non-smoker 1+

## 2025-02-28 ENCOUNTER — APPOINTMENT (OUTPATIENT)
Dept: FAMILY MEDICINE | Facility: CLINIC | Age: 85
End: 2025-02-28
Payer: MEDICARE

## 2025-02-28 VITALS
SYSTOLIC BLOOD PRESSURE: 124 MMHG | DIASTOLIC BLOOD PRESSURE: 62 MMHG | HEIGHT: 64 IN | TEMPERATURE: 97.7 F | BODY MASS INDEX: 36.54 KG/M2 | HEART RATE: 72 BPM | WEIGHT: 214 LBS | OXYGEN SATURATION: 90 %

## 2025-02-28 DIAGNOSIS — B00.1 HERPESVIRAL VESICULAR DERMATITIS: ICD-10-CM

## 2025-02-28 DIAGNOSIS — R29.6 REPEATED FALLS: ICD-10-CM

## 2025-02-28 DIAGNOSIS — R05.9 COUGH, UNSPECIFIED: ICD-10-CM

## 2025-02-28 PROCEDURE — 99213 OFFICE O/P EST LOW 20 MIN: CPT

## 2025-02-28 PROCEDURE — G2211 COMPLEX E/M VISIT ADD ON: CPT

## 2025-02-28 RX ORDER — VALACYCLOVIR 500 MG/1
500 TABLET, FILM COATED ORAL
Qty: 60 | Refills: 0 | Status: ACTIVE | COMMUNITY
Start: 2025-02-28 | End: 1900-01-01

## 2025-03-14 ENCOUNTER — APPOINTMENT (OUTPATIENT)
Dept: PULMONOLOGY | Facility: CLINIC | Age: 85
End: 2025-03-14
Payer: MEDICARE

## 2025-03-14 VITALS
DIASTOLIC BLOOD PRESSURE: 64 MMHG | HEART RATE: 64 BPM | WEIGHT: 205 LBS | RESPIRATION RATE: 16 BRPM | SYSTOLIC BLOOD PRESSURE: 132 MMHG | BODY MASS INDEX: 35 KG/M2 | HEIGHT: 64 IN | OXYGEN SATURATION: 95 %

## 2025-03-14 DIAGNOSIS — G47.33 OBSTRUCTIVE SLEEP APNEA (ADULT) (PEDIATRIC): ICD-10-CM

## 2025-03-14 DIAGNOSIS — R06.02 SHORTNESS OF BREATH: ICD-10-CM

## 2025-03-14 DIAGNOSIS — R09.82 POSTNASAL DRIP: ICD-10-CM

## 2025-03-14 DIAGNOSIS — R05.9 COUGH, UNSPECIFIED: ICD-10-CM

## 2025-03-14 DIAGNOSIS — G47.00 INSOMNIA, UNSPECIFIED: ICD-10-CM

## 2025-03-14 DIAGNOSIS — E66.9 OBESITY, UNSPECIFIED: ICD-10-CM

## 2025-03-14 DIAGNOSIS — Z87.891 PERSONAL HISTORY OF NICOTINE DEPENDENCE: ICD-10-CM

## 2025-03-14 PROCEDURE — G2211 COMPLEX E/M VISIT ADD ON: CPT

## 2025-03-14 PROCEDURE — 99214 OFFICE O/P EST MOD 30 MIN: CPT

## 2025-04-09 ENCOUNTER — APPOINTMENT (OUTPATIENT)
Dept: RHEUMATOLOGY | Facility: CLINIC | Age: 85
End: 2025-04-09
Payer: MEDICARE

## 2025-04-09 VITALS
TEMPERATURE: 97.9 F | OXYGEN SATURATION: 95 % | HEIGHT: 64 IN | HEART RATE: 86 BPM | BODY MASS INDEX: 35.68 KG/M2 | SYSTOLIC BLOOD PRESSURE: 116 MMHG | DIASTOLIC BLOOD PRESSURE: 72 MMHG | WEIGHT: 209 LBS

## 2025-04-09 PROCEDURE — 99214 OFFICE O/P EST MOD 30 MIN: CPT

## 2025-04-14 ENCOUNTER — RX RENEWAL (OUTPATIENT)
Age: 85
End: 2025-04-14

## 2025-05-01 ENCOUNTER — TRANSCRIPTION ENCOUNTER (OUTPATIENT)
Age: 85
End: 2025-05-01

## 2025-05-12 ENCOUNTER — APPOINTMENT (OUTPATIENT)
Dept: CARDIOLOGY | Facility: CLINIC | Age: 85
End: 2025-05-12

## 2025-05-12 PROCEDURE — 93306 TTE W/DOPPLER COMPLETE: CPT

## 2025-05-29 ENCOUNTER — APPOINTMENT (OUTPATIENT)
Dept: CARDIOLOGY | Facility: CLINIC | Age: 85
End: 2025-05-29
Payer: MEDICARE

## 2025-05-29 VITALS
HEART RATE: 66 BPM | BODY MASS INDEX: 35.85 KG/M2 | WEIGHT: 210 LBS | HEIGHT: 64 IN | SYSTOLIC BLOOD PRESSURE: 110 MMHG | OXYGEN SATURATION: 93 % | RESPIRATION RATE: 16 BRPM | DIASTOLIC BLOOD PRESSURE: 70 MMHG

## 2025-05-29 DIAGNOSIS — R26.9 UNSPECIFIED ABNORMALITIES OF GAIT AND MOBILITY: ICD-10-CM

## 2025-05-29 DIAGNOSIS — I72.4 ANEURYSM OF ARTERY OF LOWER EXTREMITY: ICD-10-CM

## 2025-05-29 DIAGNOSIS — I25.10 ATHEROSCLEROTIC HEART DISEASE OF NATIVE CORONARY ARTERY W/OUT ANGINA PECTORIS: ICD-10-CM

## 2025-05-29 DIAGNOSIS — E78.5 HYPERLIPIDEMIA, UNSPECIFIED: ICD-10-CM

## 2025-05-29 DIAGNOSIS — I42.8 OTHER CARDIOMYOPATHIES: ICD-10-CM

## 2025-05-29 DIAGNOSIS — E78.1 PURE HYPERGLYCERIDEMIA: ICD-10-CM

## 2025-05-29 DIAGNOSIS — R60.0 LOCALIZED EDEMA: ICD-10-CM

## 2025-05-29 PROCEDURE — 99214 OFFICE O/P EST MOD 30 MIN: CPT

## 2025-05-29 PROCEDURE — G2211 COMPLEX E/M VISIT ADD ON: CPT

## 2025-05-29 PROCEDURE — 93000 ELECTROCARDIOGRAM COMPLETE: CPT

## 2025-05-30 ENCOUNTER — APPOINTMENT (OUTPATIENT)
Dept: FAMILY MEDICINE | Facility: CLINIC | Age: 85
End: 2025-05-30
Payer: MEDICARE

## 2025-05-30 ENCOUNTER — NON-APPOINTMENT (OUTPATIENT)
Age: 85
End: 2025-05-30

## 2025-05-30 VITALS
OXYGEN SATURATION: 95 % | WEIGHT: 210 LBS | BODY MASS INDEX: 35.85 KG/M2 | SYSTOLIC BLOOD PRESSURE: 126 MMHG | HEART RATE: 72 BPM | TEMPERATURE: 97.2 F | HEIGHT: 64 IN | DIASTOLIC BLOOD PRESSURE: 66 MMHG

## 2025-05-30 DIAGNOSIS — M79.89 OTHER SPECIFIED SOFT TISSUE DISORDERS: ICD-10-CM

## 2025-05-30 DIAGNOSIS — M25.561 PAIN IN RIGHT KNEE: ICD-10-CM

## 2025-05-30 PROCEDURE — 99213 OFFICE O/P EST LOW 20 MIN: CPT

## 2025-05-30 PROCEDURE — G2211 COMPLEX E/M VISIT ADD ON: CPT

## 2025-06-02 ENCOUNTER — NON-APPOINTMENT (OUTPATIENT)
Age: 85
End: 2025-06-02

## 2025-06-09 ENCOUNTER — APPOINTMENT (OUTPATIENT)
Dept: PULMONOLOGY | Facility: CLINIC | Age: 85
End: 2025-06-09

## 2025-06-17 ENCOUNTER — APPOINTMENT (OUTPATIENT)
Dept: FAMILY MEDICINE | Facility: CLINIC | Age: 85
End: 2025-06-17
Payer: MEDICARE

## 2025-06-17 VITALS
TEMPERATURE: 98.2 F | DIASTOLIC BLOOD PRESSURE: 66 MMHG | OXYGEN SATURATION: 95 % | HEIGHT: 64 IN | BODY MASS INDEX: 36.54 KG/M2 | HEART RATE: 70 BPM | SYSTOLIC BLOOD PRESSURE: 110 MMHG | WEIGHT: 214 LBS

## 2025-06-17 PROBLEM — M66.0 RUPTURED BAKERS CYST: Status: ACTIVE | Noted: 2025-06-17

## 2025-06-17 PROCEDURE — G2211 COMPLEX E/M VISIT ADD ON: CPT

## 2025-06-17 PROCEDURE — 99213 OFFICE O/P EST LOW 20 MIN: CPT

## 2025-06-19 ENCOUNTER — APPOINTMENT (OUTPATIENT)
Dept: ORTHOPEDIC SURGERY | Facility: CLINIC | Age: 85
End: 2025-06-19
Payer: MEDICARE

## 2025-06-19 VITALS — WEIGHT: 214 LBS | BODY MASS INDEX: 36.54 KG/M2 | HEIGHT: 64 IN

## 2025-06-19 PROBLEM — M17.11 ARTHRITIS OF KNEE, RIGHT: Status: ACTIVE | Noted: 2025-06-19

## 2025-06-19 PROCEDURE — 73564 X-RAY EXAM KNEE 4 OR MORE: CPT | Mod: RT

## 2025-06-19 PROCEDURE — 20610 DRAIN/INJ JOINT/BURSA W/O US: CPT | Mod: RT

## 2025-06-19 PROCEDURE — 99204 OFFICE O/P NEW MOD 45 MIN: CPT | Mod: 25

## 2025-06-19 RX ORDER — MELOXICAM 15 MG/1
15 TABLET ORAL
Qty: 30 | Refills: 0 | Status: ACTIVE | COMMUNITY
Start: 2025-06-19 | End: 1900-01-01

## 2025-07-18 ENCOUNTER — APPOINTMENT (OUTPATIENT)
Dept: FAMILY MEDICINE | Facility: CLINIC | Age: 85
End: 2025-07-18
Payer: MEDICARE

## 2025-07-18 VITALS
SYSTOLIC BLOOD PRESSURE: 116 MMHG | TEMPERATURE: 97.3 F | HEIGHT: 64 IN | BODY MASS INDEX: 35.85 KG/M2 | OXYGEN SATURATION: 96 % | DIASTOLIC BLOOD PRESSURE: 70 MMHG | WEIGHT: 210 LBS | HEART RATE: 69 BPM

## 2025-07-18 PROBLEM — H61.23 BILATERAL IMPACTED CERUMEN: Status: ACTIVE | Noted: 2025-07-18

## 2025-07-18 PROCEDURE — G2211 COMPLEX E/M VISIT ADD ON: CPT

## 2025-07-18 PROCEDURE — 99213 OFFICE O/P EST LOW 20 MIN: CPT

## 2025-08-05 DIAGNOSIS — R26.81 UNSTEADINESS ON FEET: ICD-10-CM

## 2025-08-08 ENCOUNTER — APPOINTMENT (OUTPATIENT)
Dept: PULMONOLOGY | Facility: CLINIC | Age: 85
End: 2025-08-08
Payer: MEDICARE

## 2025-08-08 VITALS
BODY MASS INDEX: 35.85 KG/M2 | HEART RATE: 62 BPM | HEIGHT: 64 IN | WEIGHT: 210 LBS | RESPIRATION RATE: 16 BRPM | OXYGEN SATURATION: 95 % | SYSTOLIC BLOOD PRESSURE: 118 MMHG | DIASTOLIC BLOOD PRESSURE: 82 MMHG

## 2025-08-08 DIAGNOSIS — G47.33 OBSTRUCTIVE SLEEP APNEA (ADULT) (PEDIATRIC): ICD-10-CM

## 2025-08-08 DIAGNOSIS — G47.00 INSOMNIA, UNSPECIFIED: ICD-10-CM

## 2025-08-08 DIAGNOSIS — R09.82 POSTNASAL DRIP: ICD-10-CM

## 2025-08-08 DIAGNOSIS — Z87.891 PERSONAL HISTORY OF NICOTINE DEPENDENCE: ICD-10-CM

## 2025-08-08 DIAGNOSIS — R06.02 SHORTNESS OF BREATH: ICD-10-CM

## 2025-08-08 DIAGNOSIS — E66.9 OBESITY, UNSPECIFIED: ICD-10-CM

## 2025-08-08 DIAGNOSIS — G47.34 IDIOPATHIC SLEEP RELATED NONOBSTRUCTIVE ALVEOLAR HYPOVENTILATION: ICD-10-CM

## 2025-08-08 PROCEDURE — 99215 OFFICE O/P EST HI 40 MIN: CPT

## 2025-08-28 ENCOUNTER — APPOINTMENT (OUTPATIENT)
Dept: ORTHOPEDIC SURGERY | Facility: CLINIC | Age: 85
End: 2025-08-28
Payer: MEDICARE

## 2025-08-28 DIAGNOSIS — M17.11 UNILATERAL PRIMARY OSTEOARTHRITIS, RIGHT KNEE: ICD-10-CM

## 2025-08-28 PROCEDURE — G2211 COMPLEX E/M VISIT ADD ON: CPT

## 2025-08-28 PROCEDURE — 99214 OFFICE O/P EST MOD 30 MIN: CPT

## 2025-08-28 RX ORDER — CELECOXIB 200 MG/1
200 CAPSULE ORAL
Qty: 60 | Refills: 1 | Status: ACTIVE | COMMUNITY
Start: 2025-08-28 | End: 1900-01-01

## 2025-09-02 RX ORDER — HYALURONATE SODIUM, STABILIZED 60 MG/3 ML
60 SYRINGE (ML) INTRAARTICULAR
Qty: 1 | Refills: 0 | Status: ACTIVE | OUTPATIENT
Start: 2025-08-28

## 2025-09-10 ENCOUNTER — APPOINTMENT (OUTPATIENT)
Dept: ORTHOPEDIC SURGERY | Facility: CLINIC | Age: 85
End: 2025-09-10
Payer: MEDICARE

## 2025-09-10 VITALS
HEIGHT: 64 IN | BODY MASS INDEX: 36.19 KG/M2 | HEART RATE: 56 BPM | WEIGHT: 212 LBS | DIASTOLIC BLOOD PRESSURE: 60 MMHG | SYSTOLIC BLOOD PRESSURE: 118 MMHG

## 2025-09-10 DIAGNOSIS — M17.11 UNILATERAL PRIMARY OSTEOARTHRITIS, RIGHT KNEE: ICD-10-CM

## 2025-09-10 PROCEDURE — 20610 DRAIN/INJ JOINT/BURSA W/O US: CPT | Mod: RT

## 2025-09-10 PROCEDURE — 99214 OFFICE O/P EST MOD 30 MIN: CPT | Mod: 25
